# Patient Record
Sex: FEMALE | Employment: FULL TIME | URBAN - METROPOLITAN AREA
[De-identification: names, ages, dates, MRNs, and addresses within clinical notes are randomized per-mention and may not be internally consistent; named-entity substitution may affect disease eponyms.]

---

## 2022-06-20 ENCOUNTER — TELEPHONE (OUTPATIENT)
Dept: OBGYN CLINIC | Age: 27
End: 2022-06-20

## 2022-06-20 NOTE — TELEPHONE ENCOUNTER
Ob education/Intake (telephone Visit): IPV scheduled on 2022    Planned Pregnancy-      Father of [address]    Father of baby Ethnicity:     Patient Ethnicity:      Pt occupation:   [employer]    Blood Type:    Gr 1   Para 0     Section History/Vaginal Delivery History-  N/A    LMP-        Regular cycles    BCP at Conception:  No    Any medications/drugs/alcohol at conception/currently:    Sumatriptan - migraines  Spirolactone - hormonal acne  Vitamin B12  Vitamin C    Prenatal vitamins      Tobacco abuse:  Denies    Substance Abuse History- Denies    Depression/Anxiety History-Denies    Domestic Abuse History-Denies    Pets in home: 3 dogs. No cats    Last Pap:  2 yrs ago    Hx abnormal PAP: NO    Pt BMI:   21.1   Hgt 5'7\"  Wgt 135    Patient PMH:      Only surg- wisdom teeth    History of:     Any history of genetic or chromosomal aberrations, spina bifida or abdominal wall defects; omphalocele's or gastroschisis in herself the father to be or their respective families:  No    Hx Hypothyroidism: Denies    Hx preeclampsia or HTN: Denies    Hx PPD: N/A    Hx Diabetes: Denies    Hx GDM:  N/A          Hx STI: or ( hx HSV 1 or 2)  Denies    COVID Vaccine: Yes Pfizer         Flu Vaccine: No    High Risk Factor Screening for this Pregnancy:    · Age greater than 28 at delivery: No  Age 32    · History of  delivery: N/A    · History of diabetes (gestational or outside of pregnancy):   No    History of cHTN:  No    Physical activity-:  Runs 3x/wk,  Lifts some weights. Screening for pregestational diabetes:    ? BMI greater than 30: No   If Yes, need early glucola    ? BMI greater than 25 ( Americans greater than 23): No   If Yes, need 1 more risk factor. § First-degree relative with diabetes: No    § High-risk race of ethnicity (eg, [place] American, , , [address] [place] [address]):  No    § Previously given birth to an infant weighing [id number] (4000g) or more: N/A    § History of hypertension: Denies    § HDL < 35mg/dL and/or a triglyceride level greater than 250 mg/dL: No    § History of polycystic ovarian syndrome: No    § A1c greater than or equal to 5.7%: Unknown    Review :    Discussed seeing Dr. Мария zamora/boo @ [facility] , physicians in the on call group and call schedule and on call delivery, how to reach Ob/Gyn afterhours- Pt verb understanding

## 2022-06-23 ENCOUNTER — TELEPHONE (OUTPATIENT)
Dept: OBGYN CLINIC | Age: 27
End: 2022-06-23

## 2022-06-28 ENCOUNTER — OFFICE VISIT (OUTPATIENT)
Dept: PRIMARY CARE CLINIC | Age: 27
End: 2022-06-28
Payer: COMMERCIAL

## 2022-06-28 VITALS
SYSTOLIC BLOOD PRESSURE: 114 MMHG | HEIGHT: 68 IN | BODY MASS INDEX: 20.03 KG/M2 | WEIGHT: 132.2 LBS | OXYGEN SATURATION: 99 % | HEART RATE: 78 BPM | DIASTOLIC BLOOD PRESSURE: 68 MMHG

## 2022-06-28 DIAGNOSIS — Z00.00 HEALTH CARE MAINTENANCE: ICD-10-CM

## 2022-06-28 DIAGNOSIS — Z34.90 PREGNANCY, UNSPECIFIED GESTATIONAL AGE: Primary | ICD-10-CM

## 2022-06-28 PROBLEM — H52.209 ASTIGMATISM: Status: ACTIVE | Noted: 2017-10-04

## 2022-06-28 PROCEDURE — 99204 OFFICE O/P NEW MOD 45 MIN: CPT | Performed by: PHYSICIAN ASSISTANT

## 2022-06-28 RX ORDER — SUMATRIPTAN 100 MG/1
100 TABLET, FILM COATED ORAL
COMMUNITY

## 2022-06-28 RX ORDER — SPIRONOLACTONE 50 MG/1
50 TABLET, FILM COATED ORAL DAILY
COMMUNITY
End: 2022-06-28 | Stop reason: ALTCHOICE

## 2022-06-28 SDOH — ECONOMIC STABILITY: FOOD INSECURITY: WITHIN THE PAST 12 MONTHS, YOU WORRIED THAT YOUR FOOD WOULD RUN OUT BEFORE YOU GOT MONEY TO BUY MORE.: NEVER TRUE

## 2022-06-28 SDOH — ECONOMIC STABILITY: FOOD INSECURITY: WITHIN THE PAST 12 MONTHS, THE FOOD YOU BOUGHT JUST DIDN'T LAST AND YOU DIDN'T HAVE MONEY TO GET MORE.: NEVER TRUE

## 2022-06-28 ASSESSMENT — ENCOUNTER SYMPTOMS
DIARRHEA: 0
COUGH: 0
ABDOMINAL DISTENTION: 0
CHEST TIGHTNESS: 0
SORE THROAT: 0
SINUS PRESSURE: 0
SHORTNESS OF BREATH: 0
RHINORRHEA: 0
ABDOMINAL PAIN: 0
PHOTOPHOBIA: 0
VOMITING: 0
CONSTIPATION: 0
EYE DISCHARGE: 0

## 2022-06-28 ASSESSMENT — PATIENT HEALTH QUESTIONNAIRE - PHQ9
SUM OF ALL RESPONSES TO PHQ QUESTIONS 1-9: 0
SUM OF ALL RESPONSES TO PHQ9 QUESTIONS 1 & 2: 0
2. FEELING DOWN, DEPRESSED OR HOPELESS: 0
SUM OF ALL RESPONSES TO PHQ QUESTIONS 1-9: 0
1. LITTLE INTEREST OR PLEASURE IN DOING THINGS: 0

## 2022-06-28 ASSESSMENT — SOCIAL DETERMINANTS OF HEALTH (SDOH): HOW HARD IS IT FOR YOU TO PAY FOR THE VERY BASICS LIKE FOOD, HOUSING, MEDICAL CARE, AND HEATING?: NOT HARD AT ALL

## 2022-06-28 NOTE — PROGRESS NOTES
[facility] PRIMARY CARE  [address]  [facility] [address]  Dept: [phone]    Haydee Crenshaw is a 32 y.o. female New patient, who presents today for her medical conditions/complaints as noted below. Chief Complaint   Patient presents with    New Patient     Pt here today to get established. Pt did recently found out she was pregnant and has questions regarding her medications       HPI:     HPI: the patient is here for establishing care. The patient is recently pregnant and is going to [facility] for her pregnancy. That is July 5th. Will do U/S at that appointment. Taking prenatal vitamins. Last period was in April. Taking 500 mg of sumatriptan. Reviewed prior notes None  Reviewed previous Labs    No results found for: LDLCHOLESTEROL, LDLCALC    (goal LDL is <100)   No results found for: AST, ALT, BUN, CR, LABA1C, TSH  BP Readings from Last 3 Encounters:   06/28/22 114/68          (goal 120/80)    History reviewed. No pertinent past medical history. Past Surgical History:   Procedure Laterality Date    WISDOM TOOTH EXTRACTION         Family History   Problem Relation Age of Onset    Heart Disease Father     Heart Disease Paternal Grandmother     Heart Disease Paternal Grandfather        Social History     Tobacco Use    Smoking status: Never Smoker    Smokeless tobacco: Never Used   Substance Use Topics    Alcohol use: Not Currently     Comment: not currently due to pregnancy      Current Outpatient Medications   Medication Sig Dispense Refill    SUMAtriptan (IMITREX) 100 MG tablet Take 100 mg by mouth once as needed for Migraine       No current facility-administered medications for this visit.      Allergies   Allergen Reactions    Penicillins Rash     Other reaction(s): Rash-Severe  As a child  As a child         Health Maintenance   Topic Date Due    Varicella vaccine (1 of 2 - 2-dose childhood series) Never done   Leigh Bobo DTaP/Tdap/Td vaccine (6 - Tdap) 09/06/2006    Hepatitis C screen  Never done    Pap smear  03/16/2020    COVID-19 Vaccine (3 - Booster for Pfizer series) 07/08/2022    Flu vaccine (Season Ended) 09/01/2022    Depression Screen  06/28/2023    Hepatitis B vaccine  Completed    Hib vaccine  Completed    HPV vaccine  Completed    Meningococcal (ACWY) vaccine  Completed    HIV screen  Completed    Hepatitis A vaccine  Aged Out    Pneumococcal 0-64 years Vaccine  Aged Out       Subjective:      Review of Systems   Constitutional: Negative for chills, fever and unexpected weight change. HENT: Negative for congestion, hearing loss, rhinorrhea, sinus pressure and sore throat. Eyes: Negative for photophobia, discharge and visual disturbance. Respiratory: Negative for cough, chest tightness and shortness of breath. Cardiovascular: Negative for chest pain, palpitations and leg swelling. Gastrointestinal: Negative for abdominal distention, abdominal pain, constipation, diarrhea and vomiting. Endocrine: Negative for polydipsia and polyuria. Genitourinary: Negative for decreased urine volume, difficulty urinating, frequency and urgency. Musculoskeletal: Negative for arthralgias, gait problem and myalgias. Skin: Negative for rash. Allergic/Immunologic: Negative for food allergies. Neurological: Negative for dizziness, weakness, numbness and headaches. Hematological: Negative for adenopathy. Psychiatric/Behavioral: Negative for dysphoric mood and sleep disturbance. The patient is not nervous/anxious. Objective:     /68   Pulse 78   Ht 5' 8.31\" (1.735 m)   Wt 132 lb 3.2 oz (60 kg)   SpO2 99%   BMI 19.92 kg/m²   Physical Exam  Constitutional:       General: She is not in acute distress. Appearance: Normal appearance. She is not ill-appearing. HENT:      Head: Normocephalic and atraumatic.       Right Ear: External ear normal.      Left Ear: External ear normal.      Nose: Nose normal.      Mouth/Throat:      Mouth: Mucous membranes are moist.   Eyes:      Extraocular Movements: Extraocular movements intact. Conjunctiva/sclera: Conjunctivae normal.      Pupils: Pupils are equal, round, and reactive to light. Neck:      Vascular: No carotid bruit. Cardiovascular:      Rate and Rhythm: Normal rate and regular rhythm. Pulses: Normal pulses. Heart sounds: Normal heart sounds. Pulmonary:      Effort: Pulmonary effort is normal. No respiratory distress. Breath sounds: Normal breath sounds. Abdominal:      General: Bowel sounds are normal. There is no distension. Tenderness: There is no abdominal tenderness. Musculoskeletal:         General: Normal range of motion. Cervical back: Normal range of motion and neck supple. Lymphadenopathy:      Cervical: No cervical adenopathy. Skin:     General: Skin is warm and dry. Neurological:      General: No focal deficit present. Mental Status: She is alert and oriented to person, place, and time. Psychiatric:         Mood and Affect: Mood normal.         Behavior: Behavior normal.         Thought Content: Thought content normal.         Assessment and Plan:          1. Pregnancy, unspecified gestational age  -     hCG, Quantitative, Pregnancy; Future  2. Health care maintenance  -     CBC with Auto Differential; Future  -     Comprehensive Metabolic Panel; Future  -     Lipid, Fasting; Future  -     TSH With Reflex Ft4; Future     Patient educated she can take sumatriptan if absolutely necessary cut dose in half. Try hydration and rest first.  Patient educated to call her OB to make sure her appointment is set up. Monitor blood pressure at home. Continue with prenatal vitamins. Educated to tell family to get updated for Tdap. Patient given educational materials - see patient instructions. Discussed use, benefit, and side effects of prescribed medications. All patient questions answered. Pt voiced understanding. Reviewed health maintenance. Instructed to continue current medications, diet and exercise. Patient agreed with treatment plan. Follow up as directed.      Electronically signed by MATHIEU Frey on 6/28/2022 at 8:07 AM

## 2022-06-30 DIAGNOSIS — Z34.90 PREGNANCY, UNSPECIFIED GESTATIONAL AGE: ICD-10-CM

## 2022-06-30 DIAGNOSIS — Z00.00 HEALTH CARE MAINTENANCE: ICD-10-CM

## 2022-06-30 LAB
ABSOLUTE EOS #: 0.1 K/UL (ref 0–0.44)
ABSOLUTE IMMATURE GRANULOCYTE: 0.03 K/UL (ref 0–0.3)
ABSOLUTE LYMPH #: 1.57 K/UL (ref 1.1–3.7)
ABSOLUTE MONO #: 0.57 K/UL (ref 0.1–1.2)
ALBUMIN SERPL-MCNC: 4.3 G/DL (ref 3.5–5.2)
ALBUMIN/GLOBULIN RATIO: 1.7 (ref 1–2.5)
ALP BLD-CCNC: 48 U/L (ref 35–104)
ALT SERPL-CCNC: 18 U/L (ref 5–33)
ANION GAP SERPL CALCULATED.3IONS-SCNC: 10 MMOL/L (ref 9–17)
AST SERPL-CCNC: 19 U/L
BASOPHILS # BLD: 1 % (ref 0–2)
BASOPHILS ABSOLUTE: 0.04 K/UL (ref 0–0.2)
BILIRUB SERPL-MCNC: 0.67 MG/DL (ref 0.3–1.2)
BUN BLDV-MCNC: 7 MG/DL (ref 6–20)
CALCIUM SERPL-MCNC: 9.3 MG/DL (ref 8.6–10.4)
CHLORIDE BLD-SCNC: 105 MMOL/L (ref 98–107)
CHOLESTEROL, FASTING: 144 MG/DL
CHOLESTEROL/HDL RATIO: 2.5
CO2: 23 MMOL/L (ref 20–31)
CREAT SERPL-MCNC: 0.59 MG/DL (ref 0.5–0.9)
EOSINOPHILS RELATIVE PERCENT: 2 % (ref 1–4)
GFR AFRICAN AMERICAN: >60 ML/MIN
GFR NON-AFRICAN AMERICAN: >60 ML/MIN
GFR SERPL CREATININE-BSD FRML MDRD: NORMAL ML/MIN/{1.73_M2}
GLUCOSE BLD-MCNC: 78 MG/DL (ref 70–99)
HCG QUANTITATIVE: ABNORMAL MIU/ML
HCT VFR BLD CALC: 42.6 % (ref 36.3–47.1)
HDLC SERPL-MCNC: 58 MG/DL
HEMOGLOBIN: 14.3 G/DL (ref 11.9–15.1)
IMMATURE GRANULOCYTES: 0 %
LDL CHOLESTEROL: 78 MG/DL (ref 0–130)
LYMPHOCYTES # BLD: 23 % (ref 24–43)
MCH RBC QN AUTO: 30.6 PG (ref 25.2–33.5)
MCHC RBC AUTO-ENTMCNC: 33.6 G/DL (ref 28.4–34.8)
MCV RBC AUTO: 91.2 FL (ref 82.6–102.9)
MONOCYTES # BLD: 8 % (ref 3–12)
NRBC AUTOMATED: 0 PER 100 WBC
PDW BLD-RTO: 12.2 % (ref 11.8–14.4)
PLATELET # BLD: 390 K/UL (ref 138–453)
PMV BLD AUTO: 9.7 FL (ref 8.1–13.5)
POTASSIUM SERPL-SCNC: 4.4 MMOL/L (ref 3.7–5.3)
RBC # BLD: 4.67 M/UL (ref 3.95–5.11)
SEG NEUTROPHILS: 66 % (ref 36–65)
SEGMENTED NEUTROPHILS ABSOLUTE COUNT: 4.58 K/UL (ref 1.5–8.1)
SODIUM BLD-SCNC: 138 MMOL/L (ref 135–144)
TOTAL PROTEIN: 6.9 G/DL (ref 6.4–8.3)
TRIGLYCERIDE, FASTING: 40 MG/DL
TSH SERPL DL<=0.05 MIU/L-ACNC: 0.94 UIU/ML (ref 0.3–5)
WBC # BLD: 6.9 K/UL (ref 3.5–11.3)

## 2022-07-05 ENCOUNTER — INITIAL PRENATAL (OUTPATIENT)
Dept: OBGYN CLINIC | Age: 27
End: 2022-07-05

## 2022-07-05 ENCOUNTER — HOSPITAL ENCOUNTER (OUTPATIENT)
Age: 27
Setting detail: SPECIMEN
Discharge: HOME | End: 2022-07-05

## 2022-07-05 VITALS — SYSTOLIC BLOOD PRESSURE: 110 MMHG | BODY MASS INDEX: 20.49 KG/M2 | WEIGHT: 136 LBS | DIASTOLIC BLOOD PRESSURE: 64 MMHG

## 2022-07-05 DIAGNOSIS — Z34.90 NORMAL INTRAUTERINE PREGNANCY, ANTEPARTUM: ICD-10-CM

## 2022-07-05 DIAGNOSIS — N89.8 VAGINAL DISCHARGE: ICD-10-CM

## 2022-07-05 DIAGNOSIS — Z3A.09 9 WEEKS GESTATION OF PREGNANCY: ICD-10-CM

## 2022-07-05 DIAGNOSIS — Z34.90 NORMAL PREGNANCY, ANTEPARTUM: ICD-10-CM

## 2022-07-05 DIAGNOSIS — Z34.90 NORMAL INTRAUTERINE PREGNANCY, ANTEPARTUM: Primary | ICD-10-CM

## 2022-07-05 DIAGNOSIS — Z86.69 HISTORY OF MIGRAINE HEADACHES: ICD-10-CM

## 2022-07-05 LAB
AMPHETAMINE SCREEN URINE: NEGATIVE
BARBITURATE SCREEN URINE: NEGATIVE
BENZODIAZEPINE SCREEN, URINE: NEGATIVE
CANDIDA SPECIES, DNA PROBE: POSITIVE
CANNABINOID SCREEN URINE: NEGATIVE
COCAINE METABOLITE, URINE: NEGATIVE
GARDNERELLA VAGINALIS, DNA PROBE: POSITIVE
METHADONE SCREEN, URINE: NEGATIVE
OPIATES, URINE: NEGATIVE
OXYCODONE SCREEN URINE: NEGATIVE
PHENCYCLIDINE, URINE: NEGATIVE
SOURCE: ABNORMAL
TEST INFORMATION: NORMAL
TRICHOMONAS VAGINALIS DNA: NEGATIVE

## 2022-07-05 PROCEDURE — 0502F SUBSEQUENT PRENATAL CARE: CPT | Performed by: ADVANCED PRACTICE MIDWIFE

## 2022-07-05 ASSESSMENT — PATIENT HEALTH QUESTIONNAIRE - PHQ9
SUM OF ALL RESPONSES TO PHQ QUESTIONS 1-9: 0
2. FEELING DOWN, DEPRESSED OR HOPELESS: 0
SUM OF ALL RESPONSES TO PHQ QUESTIONS 1-9: 0
1. LITTLE INTEREST OR PLEASURE IN DOING THINGS: 0
SUM OF ALL RESPONSES TO PHQ9 QUESTIONS 1 & 2: 0
SUM OF ALL RESPONSES TO PHQ QUESTIONS 1-9: 0
SUM OF ALL RESPONSES TO PHQ QUESTIONS 1-9: 0

## 2022-07-05 NOTE — PROGRESS NOTES
Risk Factor Screening for this Pregnancy:   Age greater than 28 at delivery: No   History of  delivery: n/a   History of diabetes (gestational or outside of pregnancy): No, On medications: NA   Screening for pregestational diabetes:   o BMI greater than 30: No. If Yes, need early glucola   History of Hypertension: No, On medications: NA   History of bleeding disorders: No   History of migraine headaches: Yes  o How often: once every 2 weeks  o Symptoms: nausea/loss of appetite, tunnel vision, light sensitivity   o Medications/intervention history: sumatriptan    See [facility] Navigator for further genetic and risk screening. Objective:  /64   Wt 136 lb (61.7 kg)   LMP 2022   BMI 20.49 kg/m²   Body mass index is 20.49 kg/m². Physical Exam  Vitals reviewed. Constitutional:       General: She is not in acute distress. Appearance: She is well-developed. She is not diaphoretic. Neck:      Thyroid: No thyromegaly or thyroid tenderness. Cardiovascular:      Rate and Rhythm: Normal rate and regular rhythm. Heart sounds: Normal heart sounds. Pulmonary:      Effort: Pulmonary effort is normal. No respiratory distress. Breath sounds: Normal breath sounds. Abdominal:      General: There is no distension. Palpations: Abdomen is soft. Tenderness: There is no abdominal tenderness. Genitourinary:     Labia:         Right: No rash, tenderness or lesion. Left: No rash, tenderness or lesion. Cervix: Friability (pap obtained with some difficulty) present. No cervical motion tenderness. Musculoskeletal:         General: Normal range of motion. Cervical back: Normal range of motion. Skin:     General: Skin is warm and dry. Neurological:      Mental Status: She is alert and oriented to person, place, and time. Mental status is at baseline. Psychiatric:         Behavior: Behavior normal.         Thought Content:  Thought content normal. Judgment: Judgment normal.         Chaperone for Intimate Exam   Chaperone was offered as part of the rooming process. Patient declined and agrees to continue with exam without a chaperone.  Chaperone: n/a    Mother's Ethnicity:   Father's Ethnicity:       Assessment/Plan:  Pregnancy at Unknown    Role of ultrasound in pregnancy discussed; fetal survey: declines   She was counseled on office policies. She was educated about the anticipated course of prenatal care.  Warning signs were reviewed.  The patient was counseled on drug screening, HIV, Cystic Fibrosis, SMA and Sickle Cell disease, as appropriate.   o She verbally consented to HIV testing and drug screening. o She requested CF/SMA/Fragile X testing.  She was counseled on Toxoplasmosis/Listeriosis (cats/raw meat).  She denies tobacco use. The patient was counseled on the risks of tobacco abuse, both maternal and fetal. She was instructed to stop smoking if currently using tobacco. Morbidity, mortality, and cessation programs were reviewed. The risks include but are not limited to increased risks of  labor,  delivery, premature rupture of membranes, intrauterine growth restriction, intrauterine fetal demise and abruptio placenta. Secondary smoke risks were also reviewed. Increases in cancer, respiratory problems, and sudden infant death syndrome were reviewed as well.  The patient was informed of a 2-4% risk of congenital anomalies in the general population. She was questioned in detail regarding any genetic misnomer history, chromosomal abnormalities, or learning disabilities in herself, the father of the baby or their families. She denies any history as stated above.  Discussed in detail referral for genetic screening through [facility]. She verbalizes understanding and would like to proceed with: 1st trimester screen   Single Marker MSAFP testing was not ordered with attention to timing.  She was informed that karyotyping is the only way to evaluate the fetus for genetic problems and genetic lethal anomalies. Amniocentesis was discussed: Not indicated.  Route of delivery and counseling on vaginal, operative vaginal, and  sections were discussed. Further counseling will be handled by the provider at subsequent visits.  Encouraged well-balanced diet, plenty of rest when needed, prenatal vitamins daily and walking for exercise. Discussed self-help for nausea, avoiding OTC medications until consulting provider or pharmacist, other than Tylenol PRN, minimal caffeine (1-2 cups daily) and avoiding alcohol. Discussed exercise safety in pregnancy. 1. Normal intrauterine pregnancy, antepartum  - Prenatal Profile I; Future  - HIV Screen; Future  - Urine Drug Screen; Future  - C.trachomatis N.gonorrhoeae DNA  - Varicella Zoster Antibody, IgG; Future  - Hepatitis C Antibody; Future  - C.trachomatis N.gonorrhoeae DNA, Thin Prep; Future  - Culture, Urine; Future  - Prenatal type and screen; Future  - Ambulatory referral to Maternal Fetal  - PAP Smear; Future    2. 9 weeks gestation of pregnancy  - Prenatal Profile I; Future  - HIV Screen; Future  - Urine Drug Screen; Future  - C.trachomatis N.gonorrhoeae DNA  - Varicella Zoster Antibody, IgG; Future  - Hepatitis C Antibody; Future  - C.trachomatis N.gonorrhoeae DNA, Thin Prep; Future  - Culture, Urine; Future  - Prenatal type and screen; Future  - Ambulatory referral to Maternal Fetal  - PAP Smear; Future    3. Vaginal discharge  - Vaginitis DNA Probe; Future      Upon completion of the visit all questions were answered. ROS was done and is negative except as documented in HPI. History was reviewed as documented on Epic Navigator.     The patient, Gail Curran, was seen with a total time spent of 25 minutes for the visit on this date of service by the [facility]  The time component involved both face-to-face (counseling and education) and non face-to-face time (care coordination), spent in determining the total time component. Return in about 4 weeks (around 8/2/2022) for Return OB.     Electronically Signed by: Osei Lane

## 2022-07-06 ENCOUNTER — TELEPHONE (OUTPATIENT)
Dept: OBGYN CLINIC | Age: 27
End: 2022-07-06

## 2022-07-06 DIAGNOSIS — N76.0 BV (BACTERIAL VAGINOSIS): Primary | ICD-10-CM

## 2022-07-06 DIAGNOSIS — B96.89 BV (BACTERIAL VAGINOSIS): Primary | ICD-10-CM

## 2022-07-06 DIAGNOSIS — B37.9 YEAST INFECTION: ICD-10-CM

## 2022-07-06 LAB
CULTURE: NORMAL
SPECIMEN DESCRIPTION: NORMAL

## 2022-07-06 RX ORDER — METRONIDAZOLE 500 MG/1
500 TABLET ORAL 2 TIMES DAILY
Qty: 14 TABLET | Refills: 0 | Status: SHIPPED | OUTPATIENT
Start: 2022-07-06 | End: 2022-07-13

## 2022-07-07 LAB
CHLAMYDIA BY THIN PREP: NEGATIVE
N. GONORRHOEAE DNA, THIN PREP: NEGATIVE
SPECIMEN DESCRIPTION: NORMAL

## 2022-07-08 DIAGNOSIS — Z3A.09 9 WEEKS GESTATION OF PREGNANCY: ICD-10-CM

## 2022-07-08 DIAGNOSIS — Z34.90 NORMAL INTRAUTERINE PREGNANCY, ANTEPARTUM: ICD-10-CM

## 2022-07-08 LAB
ABO/RH: NORMAL
ANTIBODY SCREEN: NEGATIVE
HEPATITIS C ANTIBODY: NONREACTIVE

## 2022-07-09 LAB
ABSOLUTE EOS #: 0.1 K/UL (ref 0–0.44)
ABSOLUTE IMMATURE GRANULOCYTE: 0.03 K/UL (ref 0–0.3)
ABSOLUTE LYMPH #: 1.1 K/UL (ref 1.1–3.7)
ABSOLUTE MONO #: 0.61 K/UL (ref 0.1–1.2)
BASOPHILS # BLD: 1 % (ref 0–2)
BASOPHILS ABSOLUTE: 0.05 K/UL (ref 0–0.2)
EOSINOPHILS RELATIVE PERCENT: 1 % (ref 1–4)
HCT VFR BLD CALC: 41.9 % (ref 36.3–47.1)
HEMOGLOBIN: 13.8 G/DL (ref 11.9–15.1)
HEPATITIS B SURFACE ANTIGEN: NONREACTIVE
HIV AG/AB: NONREACTIVE
IMMATURE GRANULOCYTES: 0 %
LYMPHOCYTES # BLD: 12 % (ref 24–43)
MCH RBC QN AUTO: 30.3 PG (ref 25.2–33.5)
MCHC RBC AUTO-ENTMCNC: 32.9 G/DL (ref 28.4–34.8)
MCV RBC AUTO: 92.1 FL (ref 82.6–102.9)
MONOCYTES # BLD: 7 % (ref 3–12)
NRBC AUTOMATED: 0 PER 100 WBC
PDW BLD-RTO: 12.3 % (ref 11.8–14.4)
PLATELET # BLD: 338 K/UL (ref 138–453)
PMV BLD AUTO: 10.3 FL (ref 8.1–13.5)
RBC # BLD: 4.55 M/UL (ref 3.95–5.11)
RUBV IGG SER QL: 148.8 IU/ML
SEG NEUTROPHILS: 79 % (ref 36–65)
SEGMENTED NEUTROPHILS ABSOLUTE COUNT: 7.13 K/UL (ref 1.5–8.1)
T. PALLIDUM, IGG: NONREACTIVE
WBC # BLD: 9 K/UL (ref 3.5–11.3)

## 2022-07-11 LAB
CYTOLOGY REPORT: NORMAL
VZV IGG SER QL IA: 2.71

## 2022-07-26 ENCOUNTER — ROUTINE PRENATAL (OUTPATIENT)
Dept: PERINATAL CARE | Age: 27
End: 2022-07-26
Payer: COMMERCIAL

## 2022-07-26 ENCOUNTER — HOSPITAL ENCOUNTER (OUTPATIENT)
Age: 27
Discharge: HOME | End: 2022-07-26
Payer: COMMERCIAL

## 2022-07-26 VITALS
HEART RATE: 96 BPM | TEMPERATURE: 99 F | SYSTOLIC BLOOD PRESSURE: 110 MMHG | BODY MASS INDEX: 20.16 KG/M2 | RESPIRATION RATE: 16 BRPM | HEIGHT: 68 IN | DIASTOLIC BLOOD PRESSURE: 70 MMHG | WEIGHT: 133 LBS

## 2022-07-26 DIAGNOSIS — O36.80X0 ENCOUNTER TO DETERMINE FETAL VIABILITY OF PREGNANCY, SINGLE OR UNSPECIFIED FETUS: ICD-10-CM

## 2022-07-26 DIAGNOSIS — Z3A.11 11 WEEKS GESTATION OF PREGNANCY: ICD-10-CM

## 2022-07-26 DIAGNOSIS — Z36.9 FIRST TRIMESTER SCREENING: Primary | ICD-10-CM

## 2022-07-26 LAB
CRL: NORMAL
SAC DIAMETER: NORMAL

## 2022-07-26 PROCEDURE — 99999 PR OFFICE/OUTPT VISIT,PROCEDURE ONLY: CPT | Performed by: OBSTETRICS & GYNECOLOGY

## 2022-07-26 PROCEDURE — 76801 OB US < 14 WKS SINGLE FETUS: CPT | Performed by: OBSTETRICS & GYNECOLOGY

## 2022-07-26 PROCEDURE — 36415 COLL VENOUS BLD VENIPUNCTURE: CPT

## 2022-07-26 PROCEDURE — 81508 FTL CGEN ABNOR TWO PROTEINS: CPT

## 2022-07-26 PROCEDURE — 76813 OB US NUCHAL MEAS 1 GEST: CPT | Performed by: OBSTETRICS & GYNECOLOGY

## 2022-07-26 NOTE — PROGRESS NOTES
Obstetric/Gynecology Maternal Fetal Medicine Resident Note    Patient has opted for maternal prenatal genetic carrier screening and first trimester screening.     Yissel Quintana MD  OBGYN Resident, PGY2  [facility]  7/26/2022, 8:43 AM

## 2022-07-26 NOTE — PROGRESS NOTES
Patient has opted for the Five Gene Carrier Screen (with the [place] test from [address] [place] [address]) today.

## 2022-07-27 LAB
SEND OUT REPORT: NORMAL
TEST NAME: NORMAL

## 2022-07-30 NOTE — PROGRESS NOTES
SUBJECTIVE:    Puneet Gould is here for her return OB visit. admits to concerns today. She denies  feeling fetal movement. She denies vaginal bleeding. She denies vaginal discharge. She denies leaking of fluid. She denies uterine cramping. She denies  nausea and/or vomiting. OBJECTIVE:  Blood pressure 110/62, weight 134 lb 4 oz (60.9 kg), last menstrual period 04/30/2022. Total weight gain: -1 lbs      Puneet Gould accepted the COVID vaccine as appropriate    ASSESSMENT/PLAN:  IUP @ 122+6 weeks  S =D    Normal Pregnancy  Due date is based on LMP and confirmed with 8w6d early dating ultrasound  Patient's prenatal labs are completed. Patient's blood type A positive and rhogam is not indicated in pregnancy. Early glucola indicated: no  Patient accepted genetic screening. First trimester screen is completed and still processed. Anatomy scan scheduled 9/20/22. Glucola between 24-28 weeks. Reviewed with patient  Total weight gain in pregnancy reviewed: Yes    2. 12 weeks gestation of pregnancy  - Reviewed warning signs      She was counseled regarding all of the above:    Return in about 4 weeks (around 8/30/2022) for Return OB.     The patient, [facility],  was seen with a total time spent of 25 minutes for the visit on this date of service by the [facility]  On this date August 2, 2022,  I have spent greater than 50% of this visit:  [x] reviewing previous notes  [x] reviewing test results  [x] pre-charting  Discussed with patient:  [x] Importance of compliance with treatment plan  [x] Counseling  [] Coordinating care  [x] Documenting     Electronically Signed By: Osei Osuna

## 2022-08-02 ENCOUNTER — ROUTINE PRENATAL (OUTPATIENT)
Dept: OBGYN CLINIC | Age: 27
End: 2022-08-02

## 2022-08-02 VITALS
WEIGHT: 134.25 LBS | BODY MASS INDEX: 20.23 KG/M2 | DIASTOLIC BLOOD PRESSURE: 62 MMHG | SYSTOLIC BLOOD PRESSURE: 110 MMHG

## 2022-08-02 DIAGNOSIS — Z3A.12 12 WEEKS GESTATION OF PREGNANCY: ICD-10-CM

## 2022-08-02 DIAGNOSIS — Z34.90 NORMAL PREGNANCY, ANTEPARTUM: Primary | ICD-10-CM

## 2022-08-02 PROCEDURE — 0502F SUBSEQUENT PRENATAL CARE: CPT | Performed by: ADVANCED PRACTICE MIDWIFE

## 2022-08-23 LAB
AFP INTERPRETATION: NORMAL
CRL: 54 MM
CROWN RUMP LENGTH TWIN B: NORMAL MM
CROWN RUMP LENGTH: 54
DATE OF BIRTH: NORMAL
DONOR EGG?: NORMAL
GESTATIONAL AGE: NORMAL
HISTORY OF ANEUPLOIDY?: NO
IN VITRO FERTILIZATION: NO
MATERNAL AGE AT EDD: 27.4 YR
MATERNAL SCREEN, EER: NORMAL
MATERNAL WEIGHT: 133
MOM FOR NT, TWIN B: NORMAL
MOM FOR NT: 0.88
MOM FOR PAPP-A: 0.67
MONOCHORIONIC TWINS: NO
MSHCG-MOM: 1.82
MSHCG: NORMAL IU/L
NUCHAL TRANSLUC (NT): 1.1
NUCHAL TRANSLUC (NT): 1.1 MM
NUCHAL TRANSLUCENCY TWIN B: NORMAL MM
NUMBER OF FETUSES: 1
NUMBER OF FETUSES: NORMAL
PAPP-A MOM: 555.9 NG/ML
PATIENT WEIGHT UNITS: NORMAL
PATIENT WEIGHT: NORMAL
PREV TRISOMY PREG: NO
RACE (MATERNAL): NORMAL
RACE: NORMAL
READING MD CERT NUM: NORMAL
READING MD NAME: NORMAL
REPEAT SPECIMEN?: NO
SMOKING: NO
SMOKING: NO
SONOGRAPHER CERT NO: NORMAL
SONOGRAPHER CERT NO: NORMAL
SONOGRAPHER NAME: NORMAL
SONOGRAPHER NAME: NORMAL
SPECIMEN: NORMAL
ULTRASOUND DATE: NORMAL
ULTRASOUND DATE: NORMAL

## 2022-08-30 ENCOUNTER — ROUTINE PRENATAL (OUTPATIENT)
Dept: OBGYN CLINIC | Age: 27
End: 2022-08-30

## 2022-08-30 VITALS
DIASTOLIC BLOOD PRESSURE: 64 MMHG | SYSTOLIC BLOOD PRESSURE: 112 MMHG | BODY MASS INDEX: 20.81 KG/M2 | WEIGHT: 138.06 LBS

## 2022-08-30 DIAGNOSIS — Z34.90 NORMAL PREGNANCY, ANTEPARTUM: Primary | ICD-10-CM

## 2022-08-30 DIAGNOSIS — Z3A.16 16 WEEKS GESTATION OF PREGNANCY: ICD-10-CM

## 2022-08-30 PROCEDURE — 0502F SUBSEQUENT PRENATAL CARE: CPT | Performed by: ADVANCED PRACTICE MIDWIFE

## 2022-08-30 NOTE — PROGRESS NOTES
SUBJECTIVE:    Jeffery Dash is here for her return OB visit. denies concerns today. She denies  feeling fetal movement. She denies vaginal bleeding. She denies vaginal discharge. She denies leaking of fluid. She denies uterine cramping. She denies  nausea and/or vomiting. OBJECTIVE:  Blood pressure 112/64, weight 138 lb 1 oz (62.6 kg), last menstrual period 04/30/2022. Total weight gain: 2 lb 1 oz (0.936 kg)    Jeffery Dash accepted the COVID vaccine as appropriate    ASSESSMENT/PLAN:  IUP @ 16+6 weeks  S =D  Normal Pregnancy  Due date is based on LMP and confirmed with 8w6d early dating ultrasound  Patient's prenatal labs are completed. Patient's blood type A positive and rhogam is not indicated in pregnancy. Early glucola indicated: no  Patient accepted genetic screening. First trimester screen is completed ultrasound without any abnormal findings, risk assessment not available appears it was not drawn. Negative carrier screening. NIPT ordered 8/30/2022   Anatomy scan scheduled 9/20/22. Glucola between 24-28 weeks. Reviewed with patient  Total weight gain in pregnancy reviewed: Yes  Fetal movement reviewed      2. 16 weeks gestation of pregnancy  - Reviewed warning signs. She was counseled regarding all of the above:    Return in about 4 weeks (around 9/27/2022) for Return OB, with Heather CARRERO.     The patient, [facility],  was seen with a total time spent of 25 minutes for the visit on this date of service by the [facility]  On this date August 30, 2022,  I have spent greater than 50% of this visit:  [x] reviewing previous notes  [x] reviewing test results  [x] pre-charting  Discussed with patient:  [x] Importance of compliance with treatment plan  [x] Counseling  [] Coordinating care  [x] Documenting     Electronically Signed By: Osei Anderson

## 2022-09-20 ENCOUNTER — ROUTINE PRENATAL (OUTPATIENT)
Dept: PERINATAL CARE | Age: 27
End: 2022-09-20
Payer: COMMERCIAL

## 2022-09-20 VITALS
HEIGHT: 68 IN | WEIGHT: 145 LBS | DIASTOLIC BLOOD PRESSURE: 68 MMHG | RESPIRATION RATE: 16 BRPM | SYSTOLIC BLOOD PRESSURE: 110 MMHG | HEART RATE: 92 BPM | BODY MASS INDEX: 21.98 KG/M2 | TEMPERATURE: 98.1 F

## 2022-09-20 DIAGNOSIS — Z3A.19 19 WEEKS GESTATION OF PREGNANCY: ICD-10-CM

## 2022-09-20 DIAGNOSIS — Z13.89 ENCOUNTER FOR ROUTINE SCREENING FOR MALFORMATION USING ULTRASONICS: Primary | ICD-10-CM

## 2022-09-20 DIAGNOSIS — Z36.86 ENCOUNTER FOR SCREENING FOR RISK OF PRE-TERM LABOR: ICD-10-CM

## 2022-09-20 LAB
ABDOMINAL CIRCUMFERENCE: NORMAL
ABDOMINAL CIRCUMFERENCE: NORMAL
BIPARIETAL DIAMETER: NORMAL
BIPARIETAL DIAMETER: NORMAL
ESTIMATED FETAL WEIGHT: NORMAL
ESTIMATED FETAL WEIGHT: NORMAL
FEMORAL DIAMETER: NORMAL
FEMORAL DIAMETER: NORMAL
HC/AC: NORMAL
HC/AC: NORMAL
HEAD CIRCUMFERENCE: NORMAL
HEAD CIRCUMFERENCE: NORMAL

## 2022-09-20 PROCEDURE — 76817 TRANSVAGINAL US OBSTETRIC: CPT | Performed by: OBSTETRICS & GYNECOLOGY

## 2022-09-20 PROCEDURE — 99999 PR OFFICE/OUTPT VISIT,PROCEDURE ONLY: CPT | Performed by: OBSTETRICS & GYNECOLOGY

## 2022-09-20 PROCEDURE — 76805 OB US >/= 14 WKS SNGL FETUS: CPT | Performed by: OBSTETRICS & GYNECOLOGY

## 2022-09-27 ENCOUNTER — ROUTINE PRENATAL (OUTPATIENT)
Dept: OBGYN CLINIC | Age: 27
End: 2022-09-27

## 2022-09-27 VITALS
SYSTOLIC BLOOD PRESSURE: 110 MMHG | HEART RATE: 88 BPM | WEIGHT: 152 LBS | BODY MASS INDEX: 22.91 KG/M2 | DIASTOLIC BLOOD PRESSURE: 62 MMHG

## 2022-09-27 DIAGNOSIS — Z34.90 NORMAL PREGNANCY, ANTEPARTUM: ICD-10-CM

## 2022-09-27 DIAGNOSIS — Z3A.20 20 WEEKS GESTATION OF PREGNANCY: Primary | ICD-10-CM

## 2022-09-27 PROCEDURE — 0502F SUBSEQUENT PRENATAL CARE: CPT | Performed by: NURSE PRACTITIONER

## 2022-09-27 NOTE — PROGRESS NOTES
Presents for OB visit  Gestation 20w6d  Estimated Date of Delivery: 23        Insurance - [facility]    Plans to deliver:    FOB- Justin    1st trimester screen/NIPs: NEG cfDNA/NIPS    AFP    Fetal Echo    High Risk:    Early 1 hr GTT:    Covid Vaccine:    Flu Vaccine:    Tdap:    Rhogam:    1hr GTT:    3hr GTT:    GBS:    2nd trimester appointment with Dr. Colon Monday :     3rd trimester appointment with Dr. Colon Monday :       The patient was seen and evaluated. There was Positive fetal movements yes. No contractions or leakage of fluid. Signs and symptoms of  labor as well as labor were reviewed. The Nuchal Translucency testing was reviewed and found to be completed and  completed NIPs. A single marker MSAFP was declined for a 15-20 week gestational age window. TOP ST OH Reviewed. Dates were reviewed with the patient. An 18-22 week anatomy ultrasound has been ordered and completed  The patient will return to the office for her next visit in 4 weeks. See antepartum flow sheet. OB History    Para Term  AB Living   1             SAB IAB Ectopic Molar Multiple Live Births                    # Outcome Date GA Lbr Alejandro/2nd Weight Sex Delivery Anes PTL Lv   1 Current                     Allergies   Allergen Reactions    Penicillins     Penicillins Rash     Other reaction(s): Rash-Severe  As a child  As a child       Current Outpatient Medications   Medication Sig Dispense Refill    Prenat w/o S-AP-[id number]-FA-DHA (PNV-DHA PO) Take by mouth      SUMAtriptan (IMITREX) 100 MG tablet Take 100 mg by mouth once as needed for Migraine (Patient not taking: No sig reported)       No current facility-administered medications for this visit. History reviewed. No pertinent past medical history.     Past Surgical History:   Procedure Laterality Date    WISDOM TOOTH EXTRACTION       Headaches: no  Swelling: no  Bleeding: no  Discharge: no   Dysuria: no  Nausea: no  Heartburn: no  Epigastric pain: no  Contractions: no  Leakage of fluid: no  + fetal movement       Return 4 WEEKS    Labs as indicated CBC,1 hr GTT, UA between 24-28 weeks  Antibody screen: n/a     PTL signs reviewed, if indicated  Kick Count Instructions given if indicated: The patient was instructed on fetal kick counts and was given a kick sheet to complete every 8 hours. This is to begin at 28 weeks gestation. She was instructed that the baby should move at a minimum of ten times within one hour after a meal. The patient was instructed to lay down on her left side twenty minutes after eating and count movements for up to one hour with a target value of ten movements. She was instructed to notify the office if she did not make that target after two attempts or if after any attempt there was less than four movements. After hour numbers reviewed , along with labor signs if indicated. Contractions/cramping between 5-7 minutes and persisting even with attempts of increased water and laying on side. Fluid leakage, bleeding  NST information given no  The patient was counseled on Labor & Delivery. Route of delivery and counseling on vaginal, operative vaginal, and  sections were completed with the risks of each to both the patient as well as her baby. The possibility of a blood transfusion was discussed as well. The patient was not opposed to receiving a transfusion if needed. The patient was counseled on types of analgesia during labor and is considering either Regional or IV medication the risks, benefits and alternatives were discussed. The patient was counseled on the mandatory call ahead policy. She has been instructed to call the office at anytime prior to going into the hospital so the on-call physician may direct her to the appropriate facility for care.  Exceptions were reviewed including but not limited to: Decreased fetal movement, vaginal Bleeding or hemorrhage, trauma, readily expectant delivery, or

## 2022-09-30 DIAGNOSIS — N76.0 BV (BACTERIAL VAGINOSIS): ICD-10-CM

## 2022-09-30 DIAGNOSIS — B96.89 BV (BACTERIAL VAGINOSIS): ICD-10-CM

## 2022-10-04 RX ORDER — METRONIDAZOLE 500 MG/1
TABLET ORAL
Qty: 14 TABLET | Refills: 0 | OUTPATIENT
Start: 2022-10-04

## 2022-10-05 ENCOUNTER — HOSPITAL ENCOUNTER (OUTPATIENT)
Age: 27
Setting detail: SPECIMEN
Discharge: HOME | End: 2022-10-05

## 2022-10-05 ENCOUNTER — OFFICE VISIT (OUTPATIENT)
Dept: OBGYN CLINIC | Age: 27
End: 2022-10-05
Payer: COMMERCIAL

## 2022-10-05 VITALS
WEIGHT: 153 LBS | DIASTOLIC BLOOD PRESSURE: 64 MMHG | BODY MASS INDEX: 23.19 KG/M2 | HEIGHT: 68 IN | SYSTOLIC BLOOD PRESSURE: 110 MMHG

## 2022-10-05 DIAGNOSIS — N89.8 VAGINAL DISCHARGE: ICD-10-CM

## 2022-10-05 DIAGNOSIS — Z23 NEEDS FLU SHOT: ICD-10-CM

## 2022-10-05 DIAGNOSIS — Z34.90 NORMAL PREGNANCY, ANTEPARTUM: Primary | ICD-10-CM

## 2022-10-05 DIAGNOSIS — Z3A.22 22 WEEKS GESTATION OF PREGNANCY: ICD-10-CM

## 2022-10-05 PROCEDURE — 90674 CCIIV4 VAC NO PRSV 0.5 ML IM: CPT | Performed by: ADVANCED PRACTICE MIDWIFE

## 2022-10-05 PROCEDURE — 0502F SUBSEQUENT PRENATAL CARE: CPT | Performed by: ADVANCED PRACTICE MIDWIFE

## 2022-10-05 PROCEDURE — 90471 IMMUNIZATION ADMIN: CPT | Performed by: ADVANCED PRACTICE MIDWIFE

## 2022-10-05 NOTE — PROGRESS NOTES
SUBJECTIVE:    Samara Nunez is here for her EXTRA OB visit. admits to concerns today. Concerns include thinking she may have bv and or another yeast infection. She reports  feeling fetal movement. She denies vaginal bleeding. She denies vaginal discharge. She denies leaking of fluid. She denies uterine cramping. She denies  nausea and/or vomiting. OBJECTIVE:  Blood pressure 110/64, height 5' 8.3\" (1.735 m), weight 153 lb (69.4 kg), last menstrual period 04/30/2022. Total weight gain: 16 lb (7.258 kg)    Samara Nunez accepted the flu vaccine as appropriate. ASSESSMENT/PLAN:  IUP @ 22+0 weeks  S =D    Normal Pregnancy  Due date is based on LMP and confirmed with 8w6d early dating ultrasound  Patient's prenatal labs are completed. Patient's blood type A positive and rhogam is not indicated in pregnancy. Early glucola indicated: no  Patient accepted genetic screening. First trimester screen is completed ultrasound without any abnormal findings, risk assessment not available appears it was not drawn. Negative carrier screening. NIPT ordered 8/30/2022 - negative  Anatomy scan completed. Placenta anterior,normal cord insertion: Yes, cervical length 4.74 cm, no low lying, no previa noted. Follow up as clinically indicated. Glucola between 24-28 weeks. Reviewed   Total weight gain in pregnancy reviewed: No  Fetal movement reviewed      2. 22 weeks gestation of pregnancy  - Reviewed warning signs    3. Vaginal discharge  - Will follow up with results  - Vaginitis DNA Probe; Future  - C.trachomatis N.gonorrhoeae DNA; Future  - Culture, Urine; Future    4. Needs flu shot  - Vaccine information statement influenza (inactivated) edition 8/6/21given to patient on 10/5/2022  - Influenza, FLUCELVAX, (age 10 mo+), IM, Preservative Free, 0.5 mL        She was counseled regarding all of the above:    Return in about 4 weeks (around 11/2/2022) for Return OB.     The patient, Grayson Sandra,  was seen with a total time spent of 25 minutes for the visit on this date of service by the [facility]  On this date October 5, 2022,  I have spent greater than 50% of this visit:  [x] reviewing previous notes  [x] reviewing test results  [x] pre-charting  Discussed with patient:  [x] Importance of compliance with treatment plan  [x] Counseling  [] Coordinating care  [x] Documenting     Electronically Signed By: Osei Ross

## 2022-10-06 DIAGNOSIS — N76.0 BV (BACTERIAL VAGINOSIS): ICD-10-CM

## 2022-10-06 DIAGNOSIS — B96.89 BV (BACTERIAL VAGINOSIS): ICD-10-CM

## 2022-10-06 DIAGNOSIS — B37.31 VAGINAL YEAST INFECTION: Primary | ICD-10-CM

## 2022-10-06 LAB
C TRACH DNA GENITAL QL NAA+PROBE: NEGATIVE
CANDIDA SPECIES, DNA PROBE: POSITIVE
CULTURE: NORMAL
CULTURE: NORMAL
GARDNERELLA VAGINALIS, DNA PROBE: POSITIVE
N. GONORRHOEAE DNA: NEGATIVE
SOURCE: ABNORMAL
SPECIMEN DESCRIPTION: NORMAL
TRICHOMONAS VAGINALIS DNA: NEGATIVE

## 2022-10-06 RX ORDER — METRONIDAZOLE 500 MG/1
500 TABLET ORAL 2 TIMES DAILY
Qty: 14 TABLET | Refills: 0 | Status: SHIPPED | OUTPATIENT
Start: 2022-10-06 | End: 2022-10-13

## 2022-10-18 ENCOUNTER — TELEPHONE (OUTPATIENT)
Dept: OBGYN CLINIC | Age: 27
End: 2022-10-18

## 2022-10-18 DIAGNOSIS — Z3A.23 23 WEEKS GESTATION OF PREGNANCY: Primary | ICD-10-CM

## 2022-10-18 NOTE — TELEPHONE ENCOUNTER
----- Message from Steff Gaspar, Osei Carmita [place] sent at 10/18/2022  9:26 AM EDT -----  Urine showing yeast concern for contaminated culture.  Lets repeat and review how to obtained midstream clean catch thank you

## 2022-10-18 NOTE — TELEPHONE ENCOUNTER
Pt was made aware of her results and recommendations- will stop at the office tomorrow for recollect- clean catch midstream

## 2022-10-19 ENCOUNTER — HOSPITAL ENCOUNTER (OUTPATIENT)
Age: 27
Setting detail: SPECIMEN
Discharge: HOME | End: 2022-10-19

## 2022-10-19 DIAGNOSIS — Z3A.23 23 WEEKS GESTATION OF PREGNANCY: ICD-10-CM

## 2022-10-19 LAB
BACTERIA: ABNORMAL
BILIRUBIN URINE: NEGATIVE
CASTS UA: ABNORMAL /LPF (ref 0–2)
CASTS UA: ABNORMAL /LPF (ref 0–2)
COLOR: YELLOW
EPITHELIAL CELLS UA: ABNORMAL /HPF (ref 0–5)
GLUCOSE URINE: NEGATIVE
KETONES, URINE: NEGATIVE
LEUKOCYTE ESTERASE, URINE: ABNORMAL
NITRITE, URINE: NEGATIVE
PH UA: 6.5 (ref 5–8)
PROTEIN UA: NEGATIVE
RBC UA: ABNORMAL /HPF (ref 0–2)
SPECIFIC GRAVITY UA: 1 (ref 1–1.03)
TURBIDITY: CLEAR
URINE HGB: NEGATIVE
UROBILINOGEN, URINE: NORMAL
WBC UA: ABNORMAL /HPF (ref 0–5)
YEAST: ABNORMAL

## 2022-10-20 LAB
CULTURE: NORMAL
SPECIMEN DESCRIPTION: NORMAL

## 2022-10-26 ENCOUNTER — ROUTINE PRENATAL (OUTPATIENT)
Dept: OBGYN CLINIC | Age: 27
End: 2022-10-26

## 2022-10-26 VITALS — WEIGHT: 161 LBS | BODY MASS INDEX: 24.27 KG/M2 | SYSTOLIC BLOOD PRESSURE: 116 MMHG | DIASTOLIC BLOOD PRESSURE: 60 MMHG

## 2022-10-26 DIAGNOSIS — Z3A.25 25 WEEKS GESTATION OF PREGNANCY: Primary | ICD-10-CM

## 2022-10-26 DIAGNOSIS — B37.31 YEAST VAGINITIS: ICD-10-CM

## 2022-10-26 DIAGNOSIS — Z34.90 NORMAL PREGNANCY, ANTEPARTUM: ICD-10-CM

## 2022-10-26 PROCEDURE — 0502F SUBSEQUENT PRENATAL CARE: CPT | Performed by: OBSTETRICS & GYNECOLOGY

## 2022-10-26 RX ORDER — FLUCONAZOLE 150 MG/1
150 TABLET ORAL
Qty: 2 TABLET | Refills: 0 | Status: SHIPPED | OUTPATIENT
Start: 2022-10-26

## 2022-10-26 RX ORDER — GREEN TEA/HOODIA GORDONII 315-12.5MG
1 CAPSULE ORAL DAILY
Qty: 30 TABLET | Refills: 0 | Status: SHIPPED | OUTPATIENT
Start: 2022-10-26 | End: 2022-11-25

## 2022-10-27 NOTE — PROGRESS NOTES
Gustavo Saunders is a 32 y.o. female 18w2d        OB History    Para Term  AB Living   1             SAB IAB Ectopic Molar Multiple Live Births                    # Outcome Date GA Lbr Alejandro/2nd Weight Sex Delivery Anes PTL Lv   1 Current                Vitals  BP: 116/60  Weight: 161 lb (73 kg)  Patient Position: Sitting  Albumin: Negative  Glucose: Negative    The patient was seen and evaluated. There was positive fetal movements. No contractions or leakage of fluid. Signs and symptoms of  labor as well as labor were reviewed. The Nuchal Translucency testing was reviewed and found to be counseled and declined. A single marker MSAFP was reviewed and found to be counseled and declined. The patients anatomy ultrasound has been completed and reviewed with patient. TOP ST OH Reviewed. A 28 week lab panel was ordered. This includes a (HH, 1 hr GTT, U/A C&S). The patient is to complete this in the next two to four weeks. Pt is experiencing some musculoskeletal pain and pressure. Discussed prenatal PFT and she is very interested. Resources given. The S/S of Pre-Eclampsia were reviewed with the patient in detail. She is to report any of these if they occur. She currently denies any of these. The patient is RH positive Rhogam Ordered no    The patient was instructed on fetal kick counts and was given a kick sheet to complete every 8 hours. This is to begin at 28 weeks gestation. She was instructed that the baby should move at a minimum of ten times within one hour after a meal. The patient was instructed to lay down on her left side twenty minutes after eating and count movements for up to one hour with a target value of ten movements. She was instructed to notify the office if she did not make that target after two attempts or if after any attempt there was less than four movements.         Knows gender- boy  Insurance - [employer]    Plans to deliver: [facility] for delivery    FOB- Justin    1st trimester screen/NIPs: NEG cfDNA/NIPS    AFP: declined    Fetal Echo    High Risk:    Early 1 hr GTT:n/a    Covid Vaccine:    Flu Vaccine:10/5/2022     Tdap:    Rhogam:    1hr GTT:    3hr GTT:    GBS:    2nd trimester appointment with Dr. Olmstead Innocent :     3rd trimester appointment with Dr. Ishan Vinsonocent :          Assessment:  1Tomy Mtz is a 32 y.o. female  2.   3. 25w1d    Patient Active Problem List    Diagnosis Date Noted    Normal pregnancy, antepartum 2022     Priority: Medium    History of migraine headaches 2022     Priority: Medium    Astigmatism 10/04/2017     Priority: Medium    Acne vulgaris 2016     Priority: Medium    Acne 2014     Priority: Medium    Heart murmur, systolic      Priority: Medium        Diagnosis Orders   1. 25 weeks gestation of pregnancy        2. Normal pregnancy, antepartum        3. Yeast vaginitis  Probiotic Acidophilus (FLORANEX) TABS    fluconazole (DIFLUCAN) 150 MG tablet            Plan:  The patient will return to the office for her next visit in 3 weeks. See antepartum flow sheet. Patient was seen with total face to face time of 20 minutes. More than 50% of this visit was on counseling and education regarding her    Diagnosis Orders   1. 25 weeks gestation of pregnancy        2. Normal pregnancy, antepartum        3. Yeast vaginitis  Probiotic Acidophilus (FLORANEX) TABS    fluconazole (DIFLUCAN) 150 MG tablet       and her options. She was also counseled on her preventative health maintenance recommendations and follow-up.

## 2022-11-07 ENCOUNTER — HOSPITAL ENCOUNTER (OUTPATIENT)
Age: 27
Setting detail: SPECIMEN
Discharge: HOME | End: 2022-11-07

## 2022-11-07 DIAGNOSIS — Z34.90 NORMAL PREGNANCY, ANTEPARTUM: ICD-10-CM

## 2022-11-07 DIAGNOSIS — Z3A.20 20 WEEKS GESTATION OF PREGNANCY: ICD-10-CM

## 2022-11-07 LAB
ABSOLUTE EOS #: 0.18 K/UL (ref 0–0.44)
ABSOLUTE IMMATURE GRANULOCYTE: 0.2 K/UL (ref 0–0.3)
ABSOLUTE LYMPH #: 1.39 K/UL (ref 1.1–3.7)
ABSOLUTE MONO #: 0.76 K/UL (ref 0.1–1.2)
BACTERIA: ABNORMAL
BASOPHILS # BLD: 1 % (ref 0–2)
BASOPHILS ABSOLUTE: 0.08 K/UL (ref 0–0.2)
BILIRUBIN URINE: NEGATIVE
CASTS UA: ABNORMAL /LPF (ref 0–2)
CASTS UA: ABNORMAL /LPF (ref 0–2)
COLOR: YELLOW
EOSINOPHILS RELATIVE PERCENT: 1 % (ref 1–4)
EPITHELIAL CELLS UA: ABNORMAL /HPF (ref 0–5)
GLUCOSE ADMINISTRATION: NORMAL
GLUCOSE TOLERANCE SCREEN 50G: 70 MG/DL (ref 70–135)
GLUCOSE URINE: NEGATIVE
HCT VFR BLD CALC: 45.4 % (ref 36.3–47.1)
HEMOGLOBIN: 13.9 G/DL (ref 11.9–15.1)
IMMATURE GRANULOCYTES: 2 %
KETONES, URINE: NEGATIVE
LEUKOCYTE ESTERASE, URINE: ABNORMAL
LYMPHOCYTES # BLD: 11 % (ref 24–43)
MCH RBC QN AUTO: 30.8 PG (ref 25.2–33.5)
MCHC RBC AUTO-ENTMCNC: 30.6 G/DL (ref 28.4–34.8)
MCV RBC AUTO: 100.7 FL (ref 82.6–102.9)
MONOCYTES # BLD: 6 % (ref 3–12)
NITRITE, URINE: NEGATIVE
NRBC AUTOMATED: 0 PER 100 WBC
PDW BLD-RTO: 13.3 % (ref 11.8–14.4)
PH UA: 5.5 (ref 5–8)
PLATELET # BLD: 360 K/UL (ref 138–453)
PMV BLD AUTO: 9.6 FL (ref 8.1–13.5)
PROTEIN UA: NEGATIVE
RBC # BLD: 4.51 M/UL (ref 3.95–5.11)
RBC UA: ABNORMAL /HPF (ref 0–2)
SEG NEUTROPHILS: 79 % (ref 36–65)
SEGMENTED NEUTROPHILS ABSOLUTE COUNT: 10.35 K/UL (ref 1.5–8.1)
SPECIFIC GRAVITY UA: 1.02 (ref 1–1.03)
TURBIDITY: ABNORMAL
URINE HGB: ABNORMAL
UROBILINOGEN, URINE: NORMAL
WBC # BLD: 13 K/UL (ref 3.5–11.3)
WBC UA: ABNORMAL /HPF (ref 0–5)

## 2022-11-08 LAB
CULTURE: NORMAL
SPECIMEN DESCRIPTION: NORMAL

## 2022-11-23 ENCOUNTER — ROUTINE PRENATAL (OUTPATIENT)
Dept: OBGYN CLINIC | Age: 27
End: 2022-11-23
Payer: COMMERCIAL

## 2022-11-23 VITALS — SYSTOLIC BLOOD PRESSURE: 112 MMHG | WEIGHT: 170 LBS | BODY MASS INDEX: 25.62 KG/M2 | DIASTOLIC BLOOD PRESSURE: 60 MMHG

## 2022-11-23 DIAGNOSIS — Z34.90 NORMAL PREGNANCY, ANTEPARTUM: Primary | ICD-10-CM

## 2022-11-23 DIAGNOSIS — Z3A.29 29 WEEKS GESTATION OF PREGNANCY: ICD-10-CM

## 2022-11-23 DIAGNOSIS — Z23 NEED FOR DIPHTHERIA-TETANUS-PERTUSSIS (TDAP) VACCINE: ICD-10-CM

## 2022-11-23 PROCEDURE — 0502F SUBSEQUENT PRENATAL CARE: CPT | Performed by: ADVANCED PRACTICE MIDWIFE

## 2022-11-23 PROCEDURE — 90715 TDAP VACCINE 7 YRS/> IM: CPT | Performed by: ADVANCED PRACTICE MIDWIFE

## 2022-11-23 PROCEDURE — 90471 IMMUNIZATION ADMIN: CPT | Performed by: ADVANCED PRACTICE MIDWIFE

## 2022-11-23 NOTE — PROGRESS NOTES
SUBJECTIVE:    Sanchez Carlisle is here for her return OB visit. denies concerns today. She reports  feeling fetal movement. She denies vaginal bleeding. She denies vaginal discharge. She denies leaking of fluid. She denies uterine cramping. She denies  nausea and/or vomiting. OBJECTIVE:  Blood pressure 112/60, weight 170 lb (77.1 kg), last menstrual period 2022. Total weight gain: 34 lb (15.4 kg)    Sanchez Carlisle accepted the flu vaccine as appropriate. Sanchez Carlisle accepted the Tdap vaccine as appropriate      ASSESSMENT/PLAN:  IUP @ 29+0 weeks  S =D    Normal Pregnancy  Due date is based on LMP and confirmed with 8w6d early dating ultrasound  Patient's prenatal labs are completed. Patient's blood type A positive and rhogam is not indicated in pregnancy. Early glucola indicated: no  Patient accepted genetic screening. First trimester screen is completed ultrasound without any abnormal findings, risk assessment not available appears it was not drawn. Negative carrier screening. NIPT ordered 2022 - negative  Anatomy scan completed. Placenta anterior,normal cord insertion: Yes, cervical length 4.74 cm, no low lying, no previa noted. Follow up as clinically indicated. Glucola between 24-28 weeks. complete 79 Pass  Total weight gain in pregnancy reviewed: Yes  Fetal movement reviewed  GBS reviewed with patient  Reviewed signs and symptoms of  labor: yes  Reviewed signs and symptoms of labor: NA  Reviewed signs and symptoms of preeclampsia: yes  Reviewed signs and symptoms of taz hick contractions: yes  Reviewed growth ultrasound between 34-36 weeks in office: Yes unscheduled        2. 29 weeks gestation of pregnancy      3.  Need for diphtheria-tetanus-pertussis (Tdap) vaccine  - Vaccine information statement tdap edition 2020 given to patient on 2022  - Tdap, BOOSTRIX, (age 8 yrs+), IM  - KS INJECTION,THERAP/PROPH/DIAGNOST, IM OR SUBCUT        She was counseled regarding all of the above:    Return in about 2 weeks (around 12/7/2022) for Return OB.     The patient, Dayana Pastrana [facility],  was seen with a total time spent of 25 minutes for the visit on this date of service by the [facility]  On this date November 23, 2022,  I have spent greater than 50% of this visit:  [x] reviewing previous notes  [x] reviewing test results  [x] pre-charting  Discussed with patient:  [x] Importance of compliance with treatment plan  [x] Counseling  [] Coordinating care  [x] Documenting     Electronically Signed By: Lesli Harrison, Osei Ford

## 2022-11-23 NOTE — PROGRESS NOTES
The patient requested to have the TDAP vaccine. Consent obtained. Injection of 0.5mL given Right deltoid Intramuscular. Lot #:[id number]  EXP:11/30/24  Patient tolerated well.

## 2022-12-07 ENCOUNTER — ROUTINE PRENATAL (OUTPATIENT)
Dept: OBGYN CLINIC | Age: 27
End: 2022-12-07

## 2022-12-07 VITALS — WEIGHT: 171 LBS | BODY MASS INDEX: 25.77 KG/M2 | SYSTOLIC BLOOD PRESSURE: 110 MMHG | DIASTOLIC BLOOD PRESSURE: 64 MMHG

## 2022-12-07 DIAGNOSIS — Z3A.31 31 WEEKS GESTATION OF PREGNANCY: ICD-10-CM

## 2022-12-07 DIAGNOSIS — Z34.90 NORMAL PREGNANCY, ANTEPARTUM: Primary | ICD-10-CM

## 2022-12-07 PROCEDURE — 0502F SUBSEQUENT PRENATAL CARE: CPT | Performed by: ADVANCED PRACTICE MIDWIFE

## 2022-12-07 NOTE — PROGRESS NOTES
SUBJECTIVE:    Ayleen Tucker is here for her return OB visit. denies concerns today. She reports  feeling fetal movement. She denies vaginal bleeding. She denies vaginal discharge. She denies leaking of fluid. She denies uterine cramping. She denies  nausea and/or vomiting. OBJECTIVE:  Blood pressure 110/64, weight 171 lb (77.6 kg), last menstrual period 2022. Total weight gain: 35 lb (15.9 kg)    Ayleen Tucker accepted the flu vaccine as appropriate. Ayleen Hearing accepted the Tdap vaccine as appropriate  Ayleen Hearing accepted the COVID vaccine as appropriate    ASSESSMENT/PLAN:  IUP @ 31+0 weeks  S =D    Normal Pregnancy  Due date is based on LMP and confirmed with 8w6d early dating ultrasound  Patient's prenatal labs are completed. Patient's blood type A positive and rhogam is not indicated in pregnancy. Early glucola indicated: no  Patient accepted genetic screening. First trimester screen is completed ultrasound without any abnormal findings, risk assessment not available appears it was not drawn. Negative carrier screening. NIPT ordered 2022 - negative  Anatomy scan completed. Placenta anterior,normal cord insertion: Yes, cervical length 4.74 cm, no low lying, no previa noted. Follow up as clinically indicated. Glucola between 24-28 weeks. complete 70 Pass  Total weight gain in pregnancy reviewed: Yes  Fetal Kick Count was discussed and explained. She was instructed to lay on her left side 20 minutes after eating and count movements for up to 2 hours with a target value of 10 movements. Instructed to notify office if she does not make the target.   GBS at 36 weeks  Reviewed signs and symptoms of  labor: yes  Reviewed signs and symptoms of labor: NA  Reviewed signs and symptoms of preeclampsia: yes  Reviewed signs and symptoms of taz hick contractions: yes  Reviewed growth ultrasound between 34-36 weeks in office: Yes scheduled  Reviewed birth preferences: yes    2. 31 weeks gestation of pregnancy  - Reviewed pain management in labor in detail. Patient planning for epidural        She was counseled regarding all of the above:    Return in about 2 weeks (around 12/21/2022) for Return OB.     The patient, Jerrod \[facility] [place] [facility]\""kerry [facility],  was seen with a total time spent of 25 minutes for the visit on this date of service by the [facility]  On this date December 7, 2022,  I have spent greater than 50% of this visit:  [x] reviewing previous notes  [x] reviewing test results  [x] pre-charting  Discussed with patient:  [x] Importance of compliance with treatment plan  [x] Counseling  [] Coordinating care  [x] Documenting     Electronically Signed By: Osei Gonzalez

## 2022-12-28 ENCOUNTER — ROUTINE PRENATAL (OUTPATIENT)
Dept: OBGYN CLINIC | Age: 27
End: 2022-12-28

## 2022-12-28 ENCOUNTER — HOSPITAL ENCOUNTER (OUTPATIENT)
Age: 27
Setting detail: SPECIMEN
Discharge: HOME | End: 2022-12-28

## 2022-12-28 VITALS — WEIGHT: 178 LBS | DIASTOLIC BLOOD PRESSURE: 84 MMHG | SYSTOLIC BLOOD PRESSURE: 122 MMHG | BODY MASS INDEX: 26.83 KG/M2

## 2022-12-28 DIAGNOSIS — Z3A.34 34 WEEKS GESTATION OF PREGNANCY: Primary | ICD-10-CM

## 2022-12-28 DIAGNOSIS — N89.8 VAGINAL DISCHARGE: ICD-10-CM

## 2022-12-28 DIAGNOSIS — Z3A.25 25 WEEKS GESTATION OF PREGNANCY: ICD-10-CM

## 2022-12-28 DIAGNOSIS — Z34.90 NORMAL PREGNANCY, ANTEPARTUM: ICD-10-CM

## 2022-12-28 DIAGNOSIS — R35.0 URINARY FREQUENCY: ICD-10-CM

## 2022-12-28 PROCEDURE — 0502F SUBSEQUENT PRENATAL CARE: CPT | Performed by: OBSTETRICS & GYNECOLOGY

## 2022-12-29 LAB
CANDIDA SPECIES, DNA PROBE: POSITIVE
CULTURE: NORMAL
GARDNERELLA VAGINALIS, DNA PROBE: POSITIVE
SOURCE: ABNORMAL
SPECIMEN DESCRIPTION: NORMAL
TRICHOMONAS VAGINALIS DNA: NEGATIVE

## 2022-12-29 RX ORDER — FLUCONAZOLE 150 MG/1
150 TABLET ORAL
Qty: 2 TABLET | Refills: 0 | Status: SHIPPED | OUTPATIENT
Start: 2022-12-29

## 2022-12-29 RX ORDER — GREEN TEA/HOODIA GORDONII 315-12.5MG
1 CAPSULE ORAL DAILY
Qty: 60 TABLET | Refills: 11 | Status: SHIPPED | OUTPATIENT
Start: 2022-12-29 | End: 2023-01-28

## 2022-12-29 NOTE — PROGRESS NOTES
Brianne Olmos is a 32 y.o. female 34w3d        OB History    Para Term  AB Living   1             SAB IAB Ectopic Molar Multiple Live Births                    # Outcome Date GA Lbr Alejandro/2nd Weight Sex Delivery Anes PTL Lv   1 Current                Vitals  BP: 122/84  Weight: 178 lb (80.7 kg)  Patient Position: Sitting  Albumin: Negative  Glucose: Negative      The patient was seen and evaluated. There was positive fetal movements. No contractions. Signs and symptoms of  labor as well as labor were reviewed. The S/S of Pre-Eclampsia were reviewed with the patient in detail. She is to report any of these if they occur. She currently complains of leakage for fluid when she wakes from sleep and occasionally small amount of discharge throughout day that is clear or sometimes yellow/green. Denies vaginal discharge odor. Denies recent intercourse. The patient had her 28 week labs completed. Hospital Outpatient Visit on 2022   Component Date Value Ref Range Status    Source 2022 . VAGINAL SWAB   Final    Trichomonas Vaginalis DNA 2022 NEGATIVE  NEGATIVE Final    for Trichomonas Vaginalis    Gardnerella Vaginalis, DNA Probe 2022 POSITIVE (A)  NEGATIVE Final    for Gardnerella vaginalis    Candida Species, DNA Probe 2022 POSITIVE (A)  NEGATIVE Final    Comment: for Candida sp.   Method of testing is a DNA probe intended for detection and identification of Candida   species, Gardnerella vaginalis, and Trichomonas vaginalis nucleic acid in vaginal fluid   specimens from patients with symptoms of vaginitis/vaginosis.     ]      Knows gender- boy  Insurance - [facility]    Plans to deliver: [facility] for delivery    FOB- Justin    1st trimester screen/NIPs: NEG cfDNA/NIPS    AFP: declined    Fetal Echo    High Risk:    Early 1 hr GTT: n/a    Covid Vaccine: yes, no boosters    Flu Vaccine:10/5/2022     Tdap: given 22    Rhogam: n/a, A+    1hr GTT: passed, 70    3hr GTT:    GBS:    2nd trimester appointment with Dr. Perez Schools :     3rd trimester appointment with Dr. Perez Schools :          T-Dap Vaccine (27-36 weeks): completed    The patient was instructed on fetal kick counts and was given a kick sheet to complete every 8 hours. She was instructed that the baby should move at a minimum of ten times within one hour after a meal. The patient was instructed to lay down on her left side twenty minutes after eating and count movements for up to one hour with a target value of ten movements. She was instructed to notify the office if she did not make that target after two attempts or if after any attempt there was less than four movements. The patient reports that the targets have been made Yes.  Testing:  None    Refer to ultrasound report in media section  MICKEY 9.1  Cephalic    Assessment:  1. Wilburt Mohs is a 32 y.o. female  2.   3. 34w1d    Patient Active Problem List    Diagnosis Date Noted    Normal pregnancy, antepartum 2022     Priority: Medium    History of migraine headaches 2022     Priority: Medium    Astigmatism 10/04/2017     Priority: Medium    Acne vulgaris 2016     Priority: Medium    Acne 2014     Priority: Medium    Heart murmur, systolic      Priority: Medium        Diagnosis Orders   1. 34 weeks gestation of pregnancy        2. Normal pregnancy, antepartum        3. Vaginal discharge  Vaginitis DNA Probe      4. Urinary frequency  Culture, Urine                Plan:  The patient will return to the office for her next visit in 2 weeks. See antepartum flow sheet. Nitrazine neg, no pooling, neg valsalva  Positive light green thick discharge removed from vaginal canal and cultured. Diflucan, vaginal cultures, probiotic recommended. Low suspicion for PPROM but if concerns continue report to L&D for amniosure and repeat evaluation.        Testing Indicated: No  Scheduled with Nursing-Pt notified: No      Patient was seen with total face to face time of 20 minutes. More than 50% of this visit was on counseling and education regarding her    Diagnosis Orders   1. 34 weeks gestation of pregnancy        2. Normal pregnancy, antepartum        3. Vaginal discharge  Vaginitis DNA Probe      4. Urinary frequency  Culture, Urine       and her options. She was also counseled on her preventative health maintenance recommendations and follow-up.

## 2023-01-11 ENCOUNTER — ROUTINE PRENATAL (OUTPATIENT)
Dept: OBGYN CLINIC | Age: 28
End: 2023-01-11

## 2023-01-11 ENCOUNTER — HOSPITAL ENCOUNTER (OUTPATIENT)
Age: 28
Setting detail: SPECIMEN
Discharge: HOME | End: 2023-01-11

## 2023-01-11 VITALS — BODY MASS INDEX: 27.13 KG/M2 | WEIGHT: 180 LBS | SYSTOLIC BLOOD PRESSURE: 120 MMHG | DIASTOLIC BLOOD PRESSURE: 76 MMHG

## 2023-01-11 DIAGNOSIS — Z3A.36 36 WEEKS GESTATION OF PREGNANCY: ICD-10-CM

## 2023-01-11 DIAGNOSIS — Z34.90 NORMAL PREGNANCY, ANTEPARTUM: Primary | ICD-10-CM

## 2023-01-11 PROCEDURE — 0502F SUBSEQUENT PRENATAL CARE: CPT | Performed by: ADVANCED PRACTICE MIDWIFE

## 2023-01-11 NOTE — PROGRESS NOTES
SUBJECTIVE:    Jay Bravo is here for her return OB visit. denies concerns today. She reports  feeling fetal movement. She denies vaginal bleeding. She denies vaginal discharge. She denies leaking of fluid. She denies uterine cramping. She denies  nausea and/or vomiting. OBJECTIVE:  Blood pressure 120/76, weight 180 lb (81.6 kg), last menstrual period 2022. Total weight gain: 44 lb (20 kg)    Jay Bravo accepted the flu vaccine as appropriate. Jay Bravo accepted the Tdap vaccine as appropriate  Jay Bravo accepted the COVID vaccine as appropriate    ASSESSMENT/PLAN:  IUP @ 36+0 weeks  S =D    Normal Pregnancy  Due date is based on LMP and confirmed with 8w6d early dating ultrasound  Patient's prenatal labs are completed. Patient's blood type A positive and rhogam is not indicated in pregnancy. Early glucola indicated: no  Patient accepted genetic screening. First trimester screen is completed ultrasound without any abnormal findings, risk assessment not available appears it was not drawn. Negative carrier screening. NIPT ordered 2022 - negative  Anatomy scan completed. Placenta anterior,normal cord insertion: Yes, cervical length 4.74 cm, no low lying, no previa noted. Follow up as clinically indicated. Glucola between 24-28 weeks. complete 70 Pass  Total weight gain in pregnancy reviewed: Yes  Fetal Kick Count was discussed and explained. She was instructed to lay on her left side 20 minutes after eating and count movements for up to 2 hours with a target value of 10 movements. Instructed to notify office if she does not make the target. GBS ordered and was collected at visit  Reviewed signs and symptoms of  labor: yes  Reviewed signs and symptoms of labor: NA  Reviewed signs and symptoms of preeclampsia: yes  Reviewed signs and symptoms of taz hick contractions: yes  Reviewed growth ultrasound between 34-36 weeks in office: Yes  completed prior to visit. Growth 22 EFW 50.3%   Reviewed growth ultrasound and BPP if not delivered by 40 weeks: Yes  Reviewed birth preferences: yes    2. 36 weeks gestation of pregnancy  - Strep B screen; Future        She was counseled regarding all of the above:    Return in about 1 week (around 1/18/2023) for Return OB.     The patient, Julius Lockwood,  was seen with a total time spent of 25 minutes for the visit on this date of service by the [facility]  On this date January 11, 2023,  I have spent greater than 50% of this visit:  [x] reviewing previous notes  [x] reviewing test results  [x] pre-charting  Discussed with patient:  [x] Importance of compliance with treatment plan  [x] Counseling  [] Coordinating care  [x] Documenting     Electronically Signed By: LEWIS Medeiros CNM

## 2023-01-12 LAB
DIRECT EXAM: NORMAL
SPECIMEN DESCRIPTION: NORMAL

## 2023-01-17 ENCOUNTER — ROUTINE PRENATAL (OUTPATIENT)
Dept: OBGYN CLINIC | Age: 28
End: 2023-01-17

## 2023-01-17 VITALS — BODY MASS INDEX: 27.43 KG/M2 | WEIGHT: 182 LBS | DIASTOLIC BLOOD PRESSURE: 60 MMHG | SYSTOLIC BLOOD PRESSURE: 120 MMHG

## 2023-01-17 DIAGNOSIS — Z3A.36 36 WEEKS GESTATION OF PREGNANCY: ICD-10-CM

## 2023-01-17 DIAGNOSIS — Z34.90 NORMAL PREGNANCY, ANTEPARTUM: Primary | ICD-10-CM

## 2023-01-17 PROCEDURE — 0502F SUBSEQUENT PRENATAL CARE: CPT | Performed by: ADVANCED PRACTICE MIDWIFE

## 2023-01-17 NOTE — PROGRESS NOTES
SUBJECTIVE:    Little Reyes is here for her return OB visit. denies concerns today. She reports  feeling fetal movement. She denies vaginal bleeding. She denies vaginal discharge. She denies leaking of fluid. She denies uterine cramping. She denies  nausea and/or vomiting. OBJECTIVE:  Blood pressure 120/60, weight 182 lb (82.6 kg), last menstrual period 2022. Total weight gain: 46 lb (20.9 kg)    Little Reyes accepted the flu vaccine as appropriate. Little Reyes accepted the Tdap vaccine as appropriate  Little Reyes accepted the COVID vaccine as appropriate    ASSESSMENT/PLAN:  IUP @ 36+6 weeks  S =D    Normal Pregnancy  Due date is based on LMP and confirmed with 8w6d early dating ultrasound  Patient's prenatal labs are completed. Patient's blood type A positive and rhogam is not indicated in pregnancy. Early glucola indicated: no  Patient accepted genetic screening. First trimester screen is completed ultrasound without any abnormal findings, risk assessment not available appears it was not drawn. Negative carrier screening. NIPT ordered 2022 - negative  Anatomy scan completed. Placenta anterior,normal cord insertion: Yes, cervical length 4.74 cm, no low lying, no previa noted. Follow up as clinically indicated. Glucola between 24-28 weeks. complete 70 Pass  Total weight gain in pregnancy reviewed: Yes  Fetal Kick Count was discussed and explained. She was instructed to lay on her left side 20 minutes after eating and count movements for up to 2 hours with a target value of 10 movements. Instructed to notify office if she does not make the target.   GBS completed and was Negative  Reviewed signs and symptoms of  labor: yes  Reviewed signs and symptoms of preeclampsia: yes  Reviewed signs and symptoms of taz hick contractions: yes  Reviewed growth ultrasound between 34-36 weeks in office: Yes  completed  Reviewed growth ultrasound and BPP if not delivered by 40 weeks: Yes  Reviewed birth preferences: yes    2. 36 weeks gestation of pregnancy          She was counseled regarding all of the above:    Return in about 1 week (around 1/24/2023) for Return OB.     The patient, Mariama Stinson,  was seen with a total time spent of 25 minutes for the visit on this date of service by the [facility]  On this date January 17, 2023,  I have spent greater than 50% of this visit:  [x] reviewing previous notes  [x] reviewing test results  [x] pre-charting  Discussed with patient:  [x] Importance of compliance with treatment plan  [x] Counseling  [] Coordinating care  [x] Documenting     Electronically Signed By: LEWIS Vann CNM

## 2023-01-25 ENCOUNTER — ROUTINE PRENATAL (OUTPATIENT)
Dept: OBGYN CLINIC | Age: 28
End: 2023-01-25

## 2023-01-25 VITALS — DIASTOLIC BLOOD PRESSURE: 72 MMHG | SYSTOLIC BLOOD PRESSURE: 118 MMHG | WEIGHT: 183 LBS | BODY MASS INDEX: 27.58 KG/M2

## 2023-01-25 DIAGNOSIS — Z3A.38 38 WEEKS GESTATION OF PREGNANCY: ICD-10-CM

## 2023-01-25 DIAGNOSIS — Z34.90 NORMAL PREGNANCY, ANTEPARTUM: Primary | ICD-10-CM

## 2023-01-25 PROCEDURE — 0502F SUBSEQUENT PRENATAL CARE: CPT | Performed by: ADVANCED PRACTICE MIDWIFE

## 2023-01-25 NOTE — PROGRESS NOTES
SUBJECTIVE:    Jolie Duran is here for her return OB visit. denies concerns today. She reports  feeling fetal movement. She denies vaginal bleeding. She denies vaginal discharge. She denies leaking of fluid. She denies uterine cramping. She denies  nausea and/or vomiting. OBJECTIVE:  Blood pressure 118/72, weight 183 lb (83 kg), last menstrual period 2022. Total weight gain: 47 lb (21.3 kg)    Jolie Duran accepted the flu vaccine as appropriate. Jolie Duran accepted the Tdap vaccine as appropriate  Jolie Duran accepted the COVID vaccine as appropriate    ASSESSMENT/PLAN:  IUP @ 38+0 weeks  S =D    Normal Pregnancy  Due date is based on LMP and confirmed with 8w6d early dating ultrasound  Patient's prenatal labs are completed. Patient's blood type A positive and rhogam is not indicated in pregnancy. Early glucola indicated: no  Patient accepted genetic screening. First trimester screen is completed ultrasound without any abnormal findings, risk assessment not available appears it was not drawn. Negative carrier screening. NIPT ordered 2022 - negative  Anatomy scan completed. Placenta anterior,normal cord insertion: Yes, cervical length 4.74 cm, no low lying, no previa noted. Follow up as clinically indicated. Glucola between 24-28 weeks. complete 70 Pass  Total weight gain in pregnancy reviewed: Yes  Fetal Kick Count was discussed and explained. She was instructed to lay on her left side 20 minutes after eating and count movements for up to 2 hours with a target value of 10 movements. Instructed to notify office if she does not make the target.   GBS completed and was Negative  Reviewed signs and symptoms of  labor: yes  Reviewed signs and symptoms of preeclampsia: yes  Reviewed signs and symptoms of taz hick contractions: yes  Reviewed growth ultrasound between 34-36 weeks in office: Yes  completed 22 EFW 50.3%   Reviewed growth ultrasound and BPP if not delivered by 40 weeks: Yes  Reviewed birth preferences: yes    2. 38 weeks gestation of pregnancy          She was counseled regarding all of the above:    Return in about 1 week (around 2/1/2023) for Return OB.     The patient, Elissa Andrew,  was seen with a total time spent of 25 minutes for the visit on this date of service by the [facility]  On this date January 25, 2023,  I have spent greater than 50% of this visit:  [x] reviewing previous notes  [x] reviewing test results  [x] pre-charting  Discussed with patient:  [x] Importance of compliance with treatment plan  [x] Counseling  [] Coordinating care  [x] Documenting     Electronically Signed By: LEWIS Boone CNM

## 2023-02-01 ENCOUNTER — ROUTINE PRENATAL (OUTPATIENT)
Dept: OBGYN CLINIC | Age: 28
End: 2023-02-01

## 2023-02-01 VITALS — SYSTOLIC BLOOD PRESSURE: 116 MMHG | DIASTOLIC BLOOD PRESSURE: 64 MMHG | BODY MASS INDEX: 28.03 KG/M2 | WEIGHT: 186 LBS

## 2023-02-01 DIAGNOSIS — Z3A.39 39 WEEKS GESTATION OF PREGNANCY: ICD-10-CM

## 2023-02-01 DIAGNOSIS — Z34.90 NORMAL PREGNANCY, ANTEPARTUM: Primary | ICD-10-CM

## 2023-02-01 PROCEDURE — 0502F SUBSEQUENT PRENATAL CARE: CPT | Performed by: ADVANCED PRACTICE MIDWIFE

## 2023-02-01 NOTE — PROGRESS NOTES
SUBJECTIVE:    Светлана Crowell is here for her return OB visit. denies concerns today. Patient requesting induction at 40 weeks  She reports  feeling fetal movement. She denies vaginal bleeding. She denies vaginal discharge. She denies leaking of fluid. She denies uterine cramping. She denies  nausea and/or vomiting. OBJECTIVE:  Blood pressure 116/64, weight 186 lb (84.4 kg), last menstrual period 2022. Total weight gain: 50 lb (22.7 kg)    Светлана Crowell accepted the flu vaccine as appropriate. Светлана Crowell accepted the Tdap vaccine as appropriate  Светлана Crowell accepted the COVID vaccine as appropriate    ASSESSMENT/PLAN:  IUP @ 39+0 weeks  S =D    Normal Pregnancy  Due date is based on LMP and confirmed with 8w6d early dating ultrasound  Patient's prenatal labs are completed. Patient's blood type A positive and rhogam is not indicated in pregnancy. Early glucola indicated: no  Patient accepted genetic screening. First trimester screen is completed ultrasound without any abnormal findings, risk assessment not available appears it was not drawn. Negative carrier screening. NIPT ordered 2022 - negative  Anatomy scan completed. Placenta anterior,normal cord insertion: Yes, cervical length 4.74 cm, no low lying, no previa noted. Follow up as clinically indicated. Glucola between 24-28 weeks. complete 70 Pass  Total weight gain in pregnancy reviewed: Yes  Fetal Kick Count was discussed and explained. She was instructed to lay on her left side 20 minutes after eating and count movements for up to 2 hours with a target value of 10 movements. Instructed to notify office if she does not make the target.   GBS completed and was Negative  Reviewed signs and symptoms of  labor: yes  Reviewed signs and symptoms of preeclampsia: yes  Reviewed signs and symptoms of taz hick contractions: yes  Reviewed growth ultrasound between 34-36 weeks in office: Yes  completed 22 EFW 50.3%   Reviewed growth ultrasound and BPP if not delivered by 40 weeks: Yes  Reviewed birth preferences: yes  Per Saleem Garcia RN labor and delivery cannot schedule elective induction until 2/10/22 patient scheduled for 11am will be 40w2d    2. 39 weeks gestation of pregnancy        She was counseled regarding all of the above:    Return in about 1 week (around 2/8/2023) for Return OB.     The patient, Elvin Rogers,  was seen with a total time spent of 25 minutes for the visit on this date of service by the [facility]  On this date February 1, 2023,  I have spent greater than 50% of this visit:  [x] reviewing previous notes  [x] reviewing test results  [x] pre-charting  Discussed with patient:  [x] Importance of compliance with treatment plan  [x] Counseling  [] Coordinating care  [x] Documenting     Electronically Signed By: LEWIS Brooke CNM

## 2023-02-07 ENCOUNTER — ROUTINE PRENATAL (OUTPATIENT)
Dept: OBGYN CLINIC | Age: 28
End: 2023-02-07

## 2023-02-07 ENCOUNTER — HOSPITAL ENCOUNTER (INPATIENT)
Age: 28
LOS: 3 days | Discharge: HOME | End: 2023-02-10
Attending: OBSTETRICS & GYNECOLOGY | Admitting: SPECIALIST
Payer: COMMERCIAL

## 2023-02-07 VITALS — BODY MASS INDEX: 27.88 KG/M2 | SYSTOLIC BLOOD PRESSURE: 120 MMHG | DIASTOLIC BLOOD PRESSURE: 70 MMHG | WEIGHT: 185 LBS

## 2023-02-07 DIAGNOSIS — Z34.90 NORMAL PREGNANCY, ANTEPARTUM: Primary | ICD-10-CM

## 2023-02-07 DIAGNOSIS — Z3A.39 39 WEEKS GESTATION OF PREGNANCY: ICD-10-CM

## 2023-02-07 LAB
ABO/RH: NORMAL
ABSOLUTE EOS #: 0.03 K/UL (ref 0–0.44)
ABSOLUTE IMMATURE GRANULOCYTE: 0.19 K/UL (ref 0–0.3)
ABSOLUTE LYMPH #: 1.45 K/UL (ref 1.1–3.7)
ABSOLUTE MONO #: 0.69 K/UL (ref 0.1–1.2)
ANTIBODY SCREEN: NEGATIVE
ARM BAND NUMBER: NORMAL
BASOPHILS # BLD: 0 % (ref 0–2)
BASOPHILS ABSOLUTE: 0.05 K/UL (ref 0–0.2)
EOSINOPHILS RELATIVE PERCENT: 0 % (ref 1–4)
EXPIRATION DATE: NORMAL
HCT VFR BLD AUTO: 43.3 % (ref 36.3–47.1)
HGB BLD-MCNC: 14.6 G/DL (ref 11.9–15.1)
IMMATURE GRANULOCYTES: 2 %
LYMPHOCYTES # BLD: 12 % (ref 24–43)
MCH RBC QN AUTO: 30.2 PG (ref 25.2–33.5)
MCHC RBC AUTO-ENTMCNC: 33.7 G/DL (ref 28.4–34.8)
MCV RBC AUTO: 89.6 FL (ref 82.6–102.9)
MONOCYTES # BLD: 6 % (ref 3–12)
NRBC AUTOMATED: 0 PER 100 WBC
PDW BLD-RTO: 13.5 % (ref 11.8–14.4)
PLATELET # BLD AUTO: ABNORMAL K/UL (ref 138–453)
PLATELET, FLUORESCENCE: NORMAL K/UL (ref 138–453)
RBC # BLD: 4.83 M/UL (ref 3.95–5.11)
SEG NEUTROPHILS: 81 % (ref 36–65)
SEGMENTED NEUTROPHILS ABSOLUTE COUNT: 10.08 K/UL (ref 1.5–8.1)
T PALLIDUM AB SER QL IA: NONREACTIVE
WBC # BLD AUTO: 12.5 K/UL (ref 3.5–11.3)

## 2023-02-07 PROCEDURE — 85055 RETICULATED PLATELET ASSAY: CPT

## 2023-02-07 PROCEDURE — 86900 BLOOD TYPING SEROLOGIC ABO: CPT

## 2023-02-07 PROCEDURE — 85025 COMPLETE CBC W/AUTO DIFF WBC: CPT

## 2023-02-07 PROCEDURE — 86901 BLOOD TYPING SEROLOGIC RH(D): CPT

## 2023-02-07 PROCEDURE — 80307 DRUG TEST PRSMV CHEM ANLYZR: CPT

## 2023-02-07 PROCEDURE — 1220000000 HC SEMI PRIVATE OB R&B

## 2023-02-07 PROCEDURE — 86780 TREPONEMA PALLIDUM: CPT

## 2023-02-07 PROCEDURE — 2580000003 HC RX 258

## 2023-02-07 PROCEDURE — 6370000000 HC RX 637 (ALT 250 FOR IP): Performed by: STUDENT IN AN ORGANIZED HEALTH CARE EDUCATION/TRAINING PROGRAM

## 2023-02-07 PROCEDURE — 0502F SUBSEQUENT PRENATAL CARE: CPT | Performed by: ADVANCED PRACTICE MIDWIFE

## 2023-02-07 PROCEDURE — 86850 RBC ANTIBODY SCREEN: CPT

## 2023-02-07 RX ORDER — ONDANSETRON 2 MG/ML
4 INJECTION INTRAMUSCULAR; INTRAVENOUS EVERY 6 HOURS PRN
Status: DISCONTINUED | OUTPATIENT
Start: 2023-02-07 | End: 2023-02-09

## 2023-02-07 RX ORDER — SODIUM CHLORIDE, SODIUM LACTATE, POTASSIUM CHLORIDE, CALCIUM CHLORIDE 600; 310; 30; 20 MG/100ML; MG/100ML; MG/100ML; MG/100ML
INJECTION, SOLUTION INTRAVENOUS CONTINUOUS
Status: DISCONTINUED | OUTPATIENT
Start: 2023-02-07 | End: 2023-02-09

## 2023-02-07 RX ORDER — SODIUM CHLORIDE, SODIUM LACTATE, POTASSIUM CHLORIDE, AND CALCIUM CHLORIDE .6; .31; .03; .02 G/100ML; G/100ML; G/100ML; G/100ML
1000 INJECTION, SOLUTION INTRAVENOUS PRN
Status: DISCONTINUED | OUTPATIENT
Start: 2023-02-07 | End: 2023-02-09

## 2023-02-07 RX ORDER — DIPHENHYDRAMINE HCL 25 MG
25 TABLET ORAL EVERY 4 HOURS PRN
Status: DISCONTINUED | OUTPATIENT
Start: 2023-02-07 | End: 2023-02-09

## 2023-02-07 RX ORDER — ACETAMINOPHEN 500 MG
1000 TABLET ORAL EVERY 6 HOURS PRN
Status: DISCONTINUED | OUTPATIENT
Start: 2023-02-07 | End: 2023-02-09

## 2023-02-07 RX ORDER — SODIUM CHLORIDE 0.9 % (FLUSH) 0.9 %
5-40 SYRINGE (ML) INJECTION PRN
Status: DISCONTINUED | OUTPATIENT
Start: 2023-02-07 | End: 2023-02-09

## 2023-02-07 RX ORDER — SODIUM CHLORIDE 9 MG/ML
25 INJECTION, SOLUTION INTRAVENOUS PRN
Status: DISCONTINUED | OUTPATIENT
Start: 2023-02-07 | End: 2023-02-09

## 2023-02-07 RX ORDER — SODIUM CHLORIDE, SODIUM LACTATE, POTASSIUM CHLORIDE, AND CALCIUM CHLORIDE .6; .31; .03; .02 G/100ML; G/100ML; G/100ML; G/100ML
500 INJECTION, SOLUTION INTRAVENOUS PRN
Status: DISCONTINUED | OUTPATIENT
Start: 2023-02-07 | End: 2023-02-09

## 2023-02-07 RX ORDER — SODIUM CHLORIDE 0.9 % (FLUSH) 0.9 %
5-40 SYRINGE (ML) INJECTION EVERY 12 HOURS SCHEDULED
Status: DISCONTINUED | OUTPATIENT
Start: 2023-02-07 | End: 2023-02-09

## 2023-02-07 RX ADMIN — SODIUM CHLORIDE, POTASSIUM CHLORIDE, SODIUM LACTATE AND CALCIUM CHLORIDE: 600; 310; 30; 20 INJECTION, SOLUTION INTRAVENOUS at 18:13

## 2023-02-07 RX ADMIN — DINOPROSTONE 10 MG: 10 INSERT VAGINAL at 18:21

## 2023-02-07 NOTE — DISCHARGE SUMMARY
Obstetric Discharge Summary  [facility]    Patient Name: Karan Archuleta  Patient : 1995  Primary Care Physician: MATHIEU Peñaloza  Admit Date: 2023    Principal Diagnosis: IUP at 39w6d, admitted for IOL  BPP /8 on 23    Her pregnancy has been complicated by:   Patient Active Problem List   Diagnosis    Acne    Astigmatism    Heart Murmur    Hx Migraines     23 M Apg  Wt 8#3    gHTN (G1)     (spontaneous vaginal delivery)       Infection Present?: No  Hospital Acquired: No    Surgical Operations & Procedures:  Analgesia: epidural  Delivery Type: Spontaneous Vaginal Delivery: See Labor and Delivery Summary   Laceration(s): Absent    Consultations: Anesthesia    Pertinent Findings & Procedures:   Karan Archuleta is a 32 y.o. female  at 37w11d admitted for an induction of labor  a BPP of  off for tone; received cervidil x1, Gibson balloon, pitocin per protocol, morphine x2, compazine x1, epidural, AROM (clr), IUPC. Patient met criteria for gHTN during admission. Pre E labs were wnl, P/C 0.15. She delivered by spontaneous vaginal a Live Born infant on 23. Information for the patient's :  Armand, Baby Boy Ирина Caraballo [[id number]]   male   Birth Weight: 8 lb 3.8 oz (3.735 kg)     Apgars: 8 at 1 minute and 9 at 5 minutes.    WBC trended down 26.5>15.9    Postpartum course: normal.      Course of patient: uncomplicated    Discharge to: Home    Readmission planned: no     Recommendations on Discharge:     Medications:      Medication List        START taking these medications      acetaminophen 500 MG tablet  Commonly known as: TYLENOL  Take 2 tablets by mouth every 6 hours as needed for Pain     ibuprofen 600 MG tablet  Commonly known as: ADVIL;MOTRIN  Take 1 tablet by mouth every 6 hours as needed for Pain     sennosides-docusate sodium 8.6-50 MG tablet  Commonly known as: SENOKOT-S  Take 1 tablet by mouth daily CONTINUE taking these medications      PNV-DHA PO            STOP taking these medications      fluconazole 150 MG tablet  Commonly known as: Diflucan               Where to Get Your Medications        These medications were sent to [facility] [address] [facility], [address] [place] [address]      Phone: [phone]   acetaminophen 500 MG tablet  ibuprofen 600 MG tablet  sennosides-docusate sodium 8.6-50 MG tablet           Activity: pelvic rest x 6 weeks, no lifting greater than 15 lbs  Diet: regular diet  Follow up: 2 weeks for BP check    Condition on discharge: stable    Discharge date: 2/10/23    Sabina Quintero DO  Ob/Gyn Resident    Comments:  Home care and follow-up care were reviewed. Pelvic rest, and birth control were reviewed. Signs and symptoms of mastitis and post partum depression were reviewed. The patient is to notify her physician if any of these occur. The patient was counseled on secondary smoke risks and the increased risk of sudden infant death syndrome and respiratory problems to her baby with exposure. She was counseled on various alternate recommendations to decrease the exposure to secondary smoke to her children.

## 2023-02-07 NOTE — PROGRESS NOTES
SUBJECTIVE:    Lola Pantoja is here for her return OB visit. denies concerns today. She reports  feeling fetal movement. She denies vaginal bleeding. She denies vaginal discharge. She denies leaking of fluid. She denies uterine cramping. She denies  nausea and/or vomiting. OBJECTIVE:  Blood pressure 120/70, weight 185 lb (83.9 kg), last menstrual period 2022. Total weight gain: 49 lb (22.2 kg)      ASSESSMENT/PLAN:  IUP @ 39+6 weeks  S =D    Normal Pregnancy  Due date is based on LMP and confirmed with 8w6d early dating ultrasound  Patient's prenatal labs are completed. Patient's blood type A positive and rhogam is not indicated in pregnancy. Early glucola indicated: no  Patient accepted genetic screening. First trimester screen is completed ultrasound without any abnormal findings, risk assessment not available appears it was not drawn. Negative carrier screening. NIPT ordered 2022 - negative  Anatomy scan completed. Placenta anterior,normal cord insertion: Yes, cervical length 4.74 cm, no low lying, no previa noted. Follow up as clinically indicated. Glucola between 24-28 weeks. complete 70 Pass  Total weight gain in pregnancy reviewed: Yes  Fetal Kick Count was discussed and explained. She was instructed to lay on her left side 20 minutes after eating and count movements for up to 2 hours with a target value of 10 movements. Instructed to notify office if she does not make the target. GBS completed and was Negative  Reviewed signs and symptoms of  labor: yes  Reviewed signs and symptoms of preeclampsia: yes  Reviewed signs and symptoms of taz hick contractions: yes  Reviewed growth ultrasound between 34-36 weeks in office: Yes  completed 22 EFW 50.3%   Reviewed growth ultrasound and BPP if not delivered by 40 weeks: Yes  Reviewed birth preferences: yes    2. 39 weeks gestation of pregnancy  - 39w6d had 40 week ultrasound growth with BPP. BPP  off for tone.  Sent to labor and delivery for further evaluation. On call provider  notified by provider. Labor and delivery notified by provider. She was counseled regarding all of the above:         The patient, Feliicta Castaneda,  was seen with a total time spent of 25 minutes for the visit on this date of service by the [facility]  On this date February 7, 2023,  I have spent greater than 50% of this visit:  [x] reviewing previous notes  [x] reviewing test results  [x] pre-charting  Discussed with patient:  [x] Importance of compliance with treatment plan  [x] Counseling  [] Coordinating care  [x] Documenting     Electronically Signed By: LEWIS Ramos CNM

## 2023-02-07 NOTE — PROGRESS NOTES
Labor Progress Note    Adán Acosta is a 32 y.o. female  at 37w11d  The patient was seen and examined. Her pain is well controlled. She reports fetal movement is present, denies contractions, denies loss of fluid, denies vaginal bleeding. Cervidil placed and patient tolerated well.     Vital Signs:  Vitals:    23 1505 23 1507   BP: 131/84    Pulse: (!) 103    Resp: 20    Temp: 98.4 °F (36.9 °C)    TempSrc: Oral    Weight:  185 lb (83.9 kg)   Height:  5' 8\" (1.727 m)         FHT:  135 , moderate variability, accelerations absent decelerations absent  Contractions: none    Chaperone for Intimate Exam: Chaperone was present for entire exam, Chaperone Name: Angela Canales RN  Cervical Exam: 1 cm dilated, 50 effaced, -2 station  Pitocin: @ 0 mu/min    Membranes: Intact  Scalp Electrode in place: absent  Intrauterine Pressure Catheter in Place: absent    Interventions: cervidil placed    Assessment/Plan:  Adán Acosta is a 32 y.o. female  at 37w11d admitted for IUP   - GBS negative, No indication for GBS prophylaxis   - VSS, afebrile    - cEFM/TOCO- Cat 1, intermittent contractions   - IVF LR @ 125 ml/hr    IOL  BPP   - Cervidil placed   - Continue to monitor    Attending updated and in agreement with plan    Elizabeth Taylor DO  Ob/Gyn Resident  2023, 6:25 PM

## 2023-02-07 NOTE — FLOWSHEET NOTE
Pt received to L&D per ambulatory; sent from Dr. Felipe Barlow office for induction of labor d/t BPP 6/8. Pt states started leaking fluid 1 hour ago. Placed in room 705. Up to BR et gowned.

## 2023-02-07 NOTE — H&P
OBSTETRICAL HISTORY [facility]    Date: 2023       Time: 2:58 PM   Patient Name: Harmony Combs     Patient : 1995  Room/Bed: [facility]0705[facility]    Admission Date/Time: 2023  2:49 PM      CC: Induction of Labor  BPP (off for tone)     HPI: Harmony Combs is a 32 y.o.  at 37w11d who presents for Induction of Labor  BPP (off for tone) and leakage of fluid. Patient reports she felt a gush of fluid at 1400 and denies constant trickling since. The patient reports fetal movement is present, denies contractions, denies loss of fluid, denies vaginal bleeding. Patient denies any headache, visual changes, difficulty breathing, RUQ pain, N/V, F/C, and pain/swelling in lower extremities. Denies any dysuria or vaginal discharge. DATING:  LMP: Patient's last menstrual period was 2022 (approximate).   Estimated Date of Delivery: 23   Based on: early ultrasound, at 8 6/7 weeks GA    PREGNANCY RISK FACTORS:  Patient Active Problem List   Diagnosis    Acne vulgaris    Acne    Astigmatism    Heart murmur, systolic    Normal pregnancy, antepartum    History of migraine headaches    Rh+/RI/GBSneg       REVIEW OF SYSTEMS:   Constitutional: negative fever, negative chills  HEENT: negative visual disturbances, negative headaches, negative dizziness  Breast: negative breast abnormalities, negative breast lumps, negative nipple discharge  Respiratory: negative dyspnea, negative cough, negative SOB  Cardiovascular: negative chest pain,  negative palpitations, negative arrhythmia, negative syncope   Gastrointestinal: negative abdominal pain, negative RUQ pain, negative N/V/D, negative constipation, negative bowel changes, negative heartburn   Genitourinary: negative pelvic pain, negative dysuria, negative hematuria, negative urinary incontinence, positive vaginal discharge  Dermatological: negative rash, negative pruritis  Hematologic: negative bruising  Immunologic/Lymphatic: negative recent illness, negative recent sick contact  Musculoskeletal: negative back pain, negative myalgias, negative arthralgias  Neurological:  negative migraines, negative seizures, negative weakness  Behavior/Psych: negative depression, negative anxiety, negative SI, negative HI    OBSTETRICAL HISTORY:   OB History    Para Term  AB Living   1 0 0 0 0 0   SAB IAB Ectopic Molar Multiple Live Births   0 0 0 0 0 0      # Outcome Date GA Lbr Alejandro/2nd Weight Sex Delivery Anes PTL Lv   1 Current                PAST MEDICAL HISTORY:   has no past medical history on file. PAST SURGICAL HISTORY:   has a past surgical history that includes Nordman tooth extraction. ALLERGIES:  is allergic to penicillins and penicillins. MEDICATIONS:  Prior to Admission medications    Medication Sig Start Date End Date Taking? Authorizing Provider   fluconazole (DIFLUCAN) 150 MG tablet Take 1 tablet by mouth every 72 hours as needed (vaginal discharge/itching) 22   Gonzalez Tran,    Prenat w/o W-LY-Epgeorr-FA-DHA (PNV-DHA PO) Take by mouth    Historical Provider, MD       FAMILY HISTORY:  family history includes Heart Disease in her father, paternal grandfather, and paternal grandmother. SOCIAL HISTORY:   reports that she has never smoked. She has never used smokeless tobacco. She reports that she does not currently use alcohol. She reports that she does not use drugs.     VITALS:  Vitals:    23 1505   BP: 131/84   Pulse: (!) 103   Resp: 20   Temp: 98.4 °F (36.9 °C)   TempSrc: Oral         PHYSICAL EXAM:  Fetal Heart Monitor:  Baseline Heart Rate 140, moderate variability, present accelerations, absent decelerations  [place]: contractions, every 6 hours    General appearance:  no apparent distress, alert and cooperative  HEENT: head atraumatic, normocephalic, trachea midline, moist mucous membranes   Neurologic:  oriented, normal speech, no focal findings or movement disorder noted  Lungs:  no increased work of breathing, normal chest wall rise bilaterally  Heart:  regular rate and rhythm   Abdomen:  soft, gravid, non-tender on palpation, no right upper quadrant tenderness, no CVA tenderness bilaterally, uterus non-tender, no signs of abruption and no signs of chorioamnionitis  Extremities:  no calf tenderness bilaterally, non-edematous bilaterally  Musculoskeletal: no gross abnormalities, range of motion appropriate for age   Psychiatric: mood appropriate, normal affect     Pelvic exam: normal external genitalia, vulva, vagina, cervix, uterus and adnexa,   VULVA: normal appearing vulva with no masses, tenderness or lesions, VAGINA: normal appearing vagina with brown-tinged discharge in the posterior vault, no lesions. No pooling or valsalva   CERVIX: normal appearing cervix without lesions. Examination chaperoned by Andreea Gómez    Cervix Check: 1 cm dilated, 50 % effaced, -2 station    LIMITED BEDSIDE US:  Position: Cephalic   Fetal Heart Tones: Present  Fetal Movement: Present  Amniotic Fluid Index/Volume: MVP 2x2cm  Estimated Fetal Weight:  7 lbs 15 oz (@ ESTEPHANIE today, 23)    PRENATAL LAB RESULTS:   Blood Type/Rh: A pos  Antibody Screen: negative  Hemoglobin, Hematocrit, Platelets: 80.8 / 37.5 / 338  Rubella: immune  T.  Pallidum, IgG: non-reactive   Hep C: non-reactive   Hepatitis B Surface Antigen: non-reactive   HIV: non-reactive   Gonorrhea: negative  Chlamydia: negative  Urine culture: negative, date: 22    1 hour Glucose Tolerance Test: 70    Group B Strep: negative  Cystic Fibrosis Screen: negative  Sickle Cell Screen: negative  First Trimester Screen: low risk  Non-Invasive Prenatal Testing: low risk for aneuploidy  Anatomy US: Anterior Fundal placenta, 3 vessel cord, male gender, normal anatomy     ASSESSMENT & PLAN:  Bernardo Thompson is a 32 y.o. female  at 37w11d IOL 2/2 BPP     - GBS negative / Rh positive / R immune   - No indication for GBS prophylaxis   - VSS, afebrile   - ATSO Dr. Lucie Burgess   - CBC, TPall, T&S   - UDS R/B/A discussed, consent obtained and in chart   - Cat 1 FHT, TOCO q 6 min   - SVE: /-2 (out of 3 station)   - BSUS: cephalic   - Mode of induction: cytotec 25 mcg PO x1     Hx Systolic Heart Murmur    - Diagnosed in    - Patient had 1 year f/u with cardiology in  and no medications, further assessment, or treatment   - Currently stable on no medications   - No murmur appreciated on auscultation on arrival     Hx Migraines    - Currently managed without medications    - Denies HA at this time     BMI 27    Patient Active Problem List    Diagnosis Date Noted    Rh+/RI/GBSneg 2023     Priority: Medium    Normal pregnancy, antepartum 2022     Priority: Medium    History of migraine headaches 2022     Priority: Medium    Astigmatism 10/04/2017     Priority: Medium    Acne vulgaris 2016     Priority: Medium    Acne 2014     Priority: Medium    Heart murmur, systolic      Priority: Medium       Plan discussed with on-call physician Dr. Virgilio Mclaughlin, who is agreeable. Risks, benefits, alternatives and possible complications have been discussed in detail with the patient. Admission, and post admission procedures and expectations were discussed in detail. All questions were answered.     Patient's primary ob/gyn provider: Dr. Cast President     Steroids Given In This Pregnancy:  no  Steroids given this admission: Олег Rodarte MD  Ob/Gyn Resident  2023, 2:58 PM

## 2023-02-08 ENCOUNTER — ANESTHESIA EVENT (OUTPATIENT)
Dept: LABOR AND DELIVERY | Age: 28
End: 2023-02-08
Payer: COMMERCIAL

## 2023-02-08 ENCOUNTER — ANESTHESIA (OUTPATIENT)
Dept: LABOR AND DELIVERY | Age: 28
End: 2023-02-08
Payer: COMMERCIAL

## 2023-02-08 LAB
AMPHETAMINE SCREEN URINE: NEGATIVE
BARBITURATE SCREEN URINE: NEGATIVE
BENZODIAZEPINE SCREEN, URINE: NEGATIVE
CANNABINOID SCREEN URINE: NEGATIVE
COCAINE METABOLITE, URINE: NEGATIVE
FENTANYL URINE: NEGATIVE
METHADONE SCREEN, URINE: NEGATIVE
OPIATES, URINE: NEGATIVE
OXYCODONE SCREEN URINE: NEGATIVE
PHENCYCLIDINE, URINE: NEGATIVE
PLATELET # BLD AUTO: 320 K/UL (ref 138–453)
TEST INFORMATION: NORMAL

## 2023-02-08 PROCEDURE — 3700000025 EPIDURAL BLOCK: Performed by: ANESTHESIOLOGY

## 2023-02-08 PROCEDURE — 2500000003 HC RX 250 WO HCPCS: Performed by: NURSE ANESTHETIST, CERTIFIED REGISTERED

## 2023-02-08 PROCEDURE — 6360000002 HC RX W HCPCS

## 2023-02-08 PROCEDURE — 1220000000 HC SEMI PRIVATE OB R&B

## 2023-02-08 PROCEDURE — 85049 AUTOMATED PLATELET COUNT: CPT

## 2023-02-08 PROCEDURE — 6360000002 HC RX W HCPCS: Performed by: ANESTHESIOLOGY

## 2023-02-08 PROCEDURE — 6360000002 HC RX W HCPCS: Performed by: NURSE ANESTHETIST, CERTIFIED REGISTERED

## 2023-02-08 PROCEDURE — 2580000003 HC RX 258

## 2023-02-08 PROCEDURE — 36415 COLL VENOUS BLD VENIPUNCTURE: CPT

## 2023-02-08 RX ORDER — MORPHINE SULFATE 10 MG/ML
10 INJECTION, SOLUTION INTRAMUSCULAR; INTRAVENOUS ONCE
Status: COMPLETED | OUTPATIENT
Start: 2023-02-08 | End: 2023-02-08

## 2023-02-08 RX ORDER — ROPIVACAINE HYDROCHLORIDE 2 MG/ML
INJECTION, SOLUTION EPIDURAL; INFILTRATION; PERINEURAL CONTINUOUS PRN
Status: DISCONTINUED | OUTPATIENT
Start: 2023-02-08 | End: 2023-02-09 | Stop reason: SDUPTHER

## 2023-02-08 RX ORDER — NALOXONE HYDROCHLORIDE 0.4 MG/ML
INJECTION, SOLUTION INTRAMUSCULAR; INTRAVENOUS; SUBCUTANEOUS PRN
Status: DISCONTINUED | OUTPATIENT
Start: 2023-02-08 | End: 2023-02-09

## 2023-02-08 RX ORDER — ROPIVACAINE HYDROCHLORIDE 2 MG/ML
INJECTION, SOLUTION EPIDURAL; INFILTRATION; PERINEURAL PRN
Status: DISCONTINUED | OUTPATIENT
Start: 2023-02-08 | End: 2023-02-09 | Stop reason: SDUPTHER

## 2023-02-08 RX ORDER — LIDOCAINE HYDROCHLORIDE 20 MG/ML
INJECTION, SOLUTION EPIDURAL; INFILTRATION; INTRACAUDAL; PERINEURAL PRN
Status: DISCONTINUED | OUTPATIENT
Start: 2023-02-08 | End: 2023-02-09 | Stop reason: SDUPTHER

## 2023-02-08 RX ORDER — LIDOCAINE HYDROCHLORIDE AND EPINEPHRINE 15; 5 MG/ML; UG/ML
INJECTION, SOLUTION EPIDURAL PRN
Status: DISCONTINUED | OUTPATIENT
Start: 2023-02-08 | End: 2023-02-09 | Stop reason: SDUPTHER

## 2023-02-08 RX ORDER — ROPIVACAINE HYDROCHLORIDE 2 MG/ML
INJECTION, SOLUTION EPIDURAL; INFILTRATION; PERINEURAL
Status: COMPLETED
Start: 2023-02-08 | End: 2023-02-08

## 2023-02-08 RX ORDER — LIDOCAINE HYDROCHLORIDE 20 MG/ML
INJECTION, SOLUTION EPIDURAL; INFILTRATION; INTRACAUDAL; PERINEURAL PRN
Status: DISCONTINUED | OUTPATIENT
Start: 2023-02-08 | End: 2023-02-08

## 2023-02-08 RX ORDER — PROCHLORPERAZINE EDISYLATE 5 MG/ML
10 INJECTION INTRAMUSCULAR; INTRAVENOUS ONCE
Status: COMPLETED | OUTPATIENT
Start: 2023-02-08 | End: 2023-02-08

## 2023-02-08 RX ADMIN — LIDOCAINE HYDROCHLORIDE 2 ML: 20 INJECTION, SOLUTION EPIDURAL; INFILTRATION; INTRACAUDAL; PERINEURAL at 23:55

## 2023-02-08 RX ADMIN — Medication 5 ML: at 14:39

## 2023-02-08 RX ADMIN — MORPHINE SULFATE 10 MG: 10 INJECTION INTRAVENOUS at 08:11

## 2023-02-08 RX ADMIN — ROPIVACAINE HYDROCHLORIDE 10 ML/HR: 2 INJECTION, SOLUTION EPIDURAL; INFILTRATION at 14:45

## 2023-02-08 RX ADMIN — PROCHLORPERAZINE EDISYLATE 10 MG: 5 INJECTION INTRAMUSCULAR; INTRAVENOUS at 08:11

## 2023-02-08 RX ADMIN — LIDOCAINE HYDROCHLORIDE,EPINEPHRINE BITARTRATE 3 ML: 15; .005 INJECTION, SOLUTION EPIDURAL; INFILTRATION; INTRACAUDAL; PERINEURAL at 14:37

## 2023-02-08 RX ADMIN — ONDANSETRON 4 MG: 2 INJECTION INTRAMUSCULAR; INTRAVENOUS at 21:48

## 2023-02-08 RX ADMIN — ROPIVACAINE HYDROCHLORIDE 10 ML/HR: 2 INJECTION, SOLUTION EPIDURAL; INFILTRATION; PERINEURAL at 14:45

## 2023-02-08 RX ADMIN — SODIUM CHLORIDE, POTASSIUM CHLORIDE, SODIUM LACTATE AND CALCIUM CHLORIDE: 600; 310; 30; 20 INJECTION, SOLUTION INTRAVENOUS at 15:18

## 2023-02-08 RX ADMIN — MORPHINE SULFATE 10 MG: 10 INJECTION INTRAVENOUS at 09:47

## 2023-02-08 RX ADMIN — SODIUM CHLORIDE, POTASSIUM CHLORIDE, SODIUM LACTATE AND CALCIUM CHLORIDE: 600; 310; 30; 20 INJECTION, SOLUTION INTRAVENOUS at 06:10

## 2023-02-08 RX ADMIN — LIDOCAINE HYDROCHLORIDE 2 ML: 20 INJECTION, SOLUTION EPIDURAL; INFILTRATION; INTRACAUDAL; PERINEURAL at 23:52

## 2023-02-08 RX ADMIN — LIDOCAINE HYDROCHLORIDE 3 ML: 20 INJECTION, SOLUTION EPIDURAL; INFILTRATION; INTRACAUDAL; PERINEURAL at 23:47

## 2023-02-08 RX ADMIN — ROPIVACAINE HYDROCHLORIDE 10 ML/HR: 2 INJECTION, SOLUTION EPIDURAL; INFILTRATION at 20:50

## 2023-02-08 RX ADMIN — Medication 1 MILLI-UNITS/MIN: at 07:55

## 2023-02-08 RX ADMIN — Medication 5 ML: at 14:42

## 2023-02-08 RX ADMIN — LIDOCAINE HYDROCHLORIDE 3 ML: 20 INJECTION, SOLUTION EPIDURAL; INFILTRATION; INTRACAUDAL; PERINEURAL at 23:44

## 2023-02-08 ASSESSMENT — PAIN DESCRIPTION - LOCATION
LOCATION: ABDOMEN;BACK
LOCATION: ABDOMEN;BACK

## 2023-02-08 ASSESSMENT — PAIN SCALES - GENERAL
PAINLEVEL_OUTOF10: 6
PAINLEVEL_OUTOF10: 7

## 2023-02-08 ASSESSMENT — PAIN DESCRIPTION - DESCRIPTORS: DESCRIPTORS: CRAMPING

## 2023-02-08 NOTE — FLOWSHEET NOTE
*** Anesthesia notified of request for epidural.  *** -  Dr. Cuate Montanez at bedside and consent form signed. Everyone in room is wearing a mask. Risks and benefits discussed and patient verbalizes understanding. Patient verbalizes to proceed with procedure. Time out performed. *** - to dangle position. ***  - procedure started. ***  - test dose given. Patient tolerated procedure well. *** -to low fowlers position with hip wedge in place. ***  -first dose given. *** - epidural pump started. See anesthesia note.

## 2023-02-08 NOTE — CARE COORDINATION
ANTEPARTUM NOTE    39 weeks gestation of pregnancy [Z3A.39]    Carmen Knight was admitted to L&D on 2/7/23 for IOL for 6/8 BPP @ 39w6d    OB GYN Provider: Dr. Leila Eisenberg [facility]    Will meet with patient after delivery to verify name/address/phone/insurance and discuss discharge planning. Anticipate DC home 2 nights after vaginal delivery or 4 nights after C/S delivery as long as hemodynamically stable.

## 2023-02-08 NOTE — PROGRESS NOTES
Labor Progress Note    Anuj Chung is a 32 y.o. female  at 37w0d  The patient was seen and examined. Her pain is well controlled with epidural. She reports fetal movement is present, complains of contractions, denies loss of fluid, denies vaginal bleeding.        Vital Signs:  Vitals:    23 1530 23 1600 23 1630 23 1700   BP: 112/66  106/64 90/69   Pulse: 63  65 78   Resp: 16  16 16   Temp:       TempSrc:       SpO2: 100% 99% 99% 98%   Weight:       Height:           FHT: 150, moderate variability, accelerations present, decelerations absent  Contractions: regular, every 2-3 minutes    Chaperone for Intimate Exam: Chaperone was present for entire exam, Chaperone Name: Wander Rosas RN  Cervical Exam: 6 cm dilated, 80 effaced, 0 station  Pitocin: @ 6 mu/min    Membranes: Ruptured clear fluid  Scalp Electrode in place: absent  Intrauterine Pressure Catheter in Place: present    Interventions: SVE, IUPC placement    Assessment/Plan:  Anuj Chung is a 32 y.o. female  at 37w0d admitted for IOL  BPP   - GBS negative, No indication for GBS prophylaxis  - VSS, Afebrile  - cEFM/TOCO  - S/p Cervidil   - s/p altamirano balloon  - Epidural in place  - Altamirano in place   - Pitocin per protocol   - IUPC in place  - AROM clear fluid  - Anticipate vaginal delivery  - Continue to monitor            Attending updated and in agreement with plan    Ann Smith DO  Ob/Gyn Resident  2023, 5:27 PM

## 2023-02-08 NOTE — PROGRESS NOTES
Labor Progress Note    Elissa Andrew is a 32 y.o. female  at 37w0d  The patient was seen and examined. Writer to bedside  to altamirano balloon spontaneously removed. Her pain is well controlled. She reports fetal movement is present, complains of contractions, denies loss of fluid, denies vaginal bleeding.        Vital Signs:  Vitals:    23 1100 23 1130 23 1200 23 1207   BP:  97/67     Pulse:  95     Resp:  16     Temp:       TempSrc:       SpO2: 98% 96% 96% 96%   Weight:       Height:           FHT:  145 , moderate variability, accelerations present, decelerations absent  Contractions: regular, every 4-5 minutes    Chaperone for Intimate Exam: Chaperone was present for entire exam, Chaperone Name: Trenton Dutta RN   Cervical Exam: 5 cm dilated, 50 effaced, -1 station  Pitocin: @ 8 mu/min    Membranes: Intact  Scalp Electrode in place: absent  Intrauterine Pressure Catheter in Place: absent    Interventions: SVE    Assessment/Plan:  Elissa Andrew is a 32 y.o. female  at 37w0d admitted for IOL 2/2 6/10 BPP    - GBS negative, No indication for GBS prophylaxis   - VSS, afebrile   - cEFM/TOCO: cat 1 tracing    - S/p Cervidil    - S/p altamirano balloon    - Epidural ordered    - Pitocin per protocol    - Will plan for AROM following epidural placement    - Continue current care     Attending updated and in agreement with plan    Emmanuel Ferris MD  Ob/Gyn Resident  2023, 1:16 PM

## 2023-02-08 NOTE — ANESTHESIA PRE PROCEDURE
Department of Anesthesiology  Preprocedure Note       Name:  Tena Mota   Age:  32 y.o.  :  1995                                          MRN:  [id number]         Date:  2023      Surgeon: Nilsa Labor    Procedure: Labor Epidural    Medications prior to admission:   Prior to Admission medications    Medication Sig Start Date End Date Taking? Authorizing Provider   fluconazole (DIFLUCAN) 150 MG tablet Take 1 tablet by mouth every 72 hours as needed (vaginal discharge/itching)  Patient not taking: Reported on 22   Bao Tran DO   Prenat w/o J-MM-Zovovzu-FA-DHA (PNV-DHA PO) Take by mouth    Historical Provider, MD       Current medications:    Current Facility-Administered Medications   Medication Dose Route Frequency Provider Last Rate Last Admin    oxytocin (PITOCIN) 30 units in 500 mL infusion  1-20 terence-units/min IntraVENous Continuous Thai Hoyos MD 8 mL/hr at 23 1130 8 terence-units/min at 23 1130    lactated ringers IV soln infusion   IntraVENous Continuous Joanna Maharaj  mL/hr at 23 0610 New Bag at 23 0610    lactated ringers bolus  500 mL IntraVENous PRN Joanna Maharaj MD        Or    lactated ringers bolus  1,000 mL IntraVENous PRN Joanna Maharaj MD        sodium chloride flush 0.9 % injection 5-40 mL  5-40 mL IntraVENous 2 times per day Joanna Maharaj MD        sodium chloride flush 0.9 % injection 5-40 mL  5-40 mL IntraVENous PRN Joanna Maharaj MD        0.9 % sodium chloride infusion  25 mL IntraVENous PRN Joanna Maharaj MD        ondansetron [facility]) injection 4 mg  4 mg IntraVENous Q6H PRN Joanna Maharaj MD        diphenhydrAMINE (BENADRYL) tablet 25 mg  25 mg Oral Q4H PRN Joanna Maharaj MD        acetaminophen (TYLENOL) tablet 1,000 mg  1,000 mg Oral Q6H PRN Joanna Maharaj MD           Allergies:     Allergies   Allergen Reactions    Penicillins     Penicillins Rash     Other reaction(s): Rash-Severe  As a child  As a child         Problem List:    Patient Active Problem List   Diagnosis Code    Acne L70.9    Astigmatism H52.209    Heart Murmur R01.1    Hx Migraines Z86.69    44 Weeks Gestation Z3A.39       Past Medical History:  History reviewed. No pertinent past medical history. Past Surgical History:        Procedure Laterality Date    WISDOM TOOTH EXTRACTION         Social History:    Social History     Tobacco Use    Smoking status: Never    Smokeless tobacco: Never   Substance Use Topics    Alcohol use: Not Currently     Comment: not currently due to pregnancy                                Counseling given: Not Answered      Vital Signs (Current):   Vitals:    02/08/23 1100 02/08/23 1130 02/08/23 1200 02/08/23 1207   BP:  97/67     Pulse:  95     Resp:  16     Temp:       TempSrc:       SpO2: 98% 96% 96% 96%   Weight:       Height:                                                  BP Readings from Last 3 Encounters:   02/08/23 97/67   02/07/23 120/70   02/01/23 116/64       NPO Status:  1300 2/8/2023                                                                               BMI:   Wt Readings from Last 3 Encounters:   02/07/23 185 lb (83.9 kg)   02/07/23 185 lb (83.9 kg)   02/01/23 186 lb (84.4 kg)     Body mass index is 28.13 kg/m².     CBC:   Lab Results   Component Value Date/Time    WBC 12.5 02/07/2023 05:30 PM    RBC 4.83 02/07/2023 05:30 PM    HGB 14.6 02/07/2023 05:30 PM    HCT 43.3 02/07/2023 05:30 PM    MCV 89.6 02/07/2023 05:30 PM    RDW 13.5 02/07/2023 05:30 PM    PLT See Reflexed IPF Result 02/07/2023 05:30 PM       CMP:   Lab Results   Component Value Date/Time     06/30/2022 07:37 AM    K 4.4 06/30/2022 07:37 AM     06/30/2022 07:37 AM    CO2 23 06/30/2022 07:37 AM    BUN 7 06/30/2022 07:37 AM    CREATININE 0.59 06/30/2022 07:37 AM    GFRAA >60 06/30/2022 07:37 AM    LABGLOM >60 06/30/2022 07:37 AM    GLUCOSE 70 11/07/2022 08:55 AM    GLUCOSE 78 06/30/2022 07:37 AM PROT 6.9 2022 07:37 AM    CALCIUM 9.3 2022 07:37 AM    BILITOT 0.67 2022 07:37 AM    ALKPHOS 48 2022 07:37 AM    AST 19 2022 07:37 AM    ALT 18 2022 07:37 AM       POC Tests: No results for input(s): POCGLU, POCNA, POCK, POCCL, POCBUN, POCHEMO, POCHCT in the last 72 hours. Coags: No results found for: PROTIME, INR, APTT    HCG (If Applicable):   Lab Results   Component Value Date    HCGQUANT 103,926 (H) 2022        ABGs: No results found for: PHART, PO2ART, CLO4TRJ, KPH0ONV, BEART, Q7LFKPPJ     Type & Screen (If Applicable):  No results found for: LABABO, LABRH    Drug/Infectious Status (If Applicable):  Lab Results   Component Value Date/Time    HEPCAB NONREACTIVE 2022 01:34 PM       COVID-19 Screening (If Applicable): No results found for: COVID19        Anesthesia Evaluation  Patient summary reviewed and Nursing notes reviewed no history of anesthetic complications:   Airway: Mallampati: II  TM distance: >3 FB   Neck ROM: full  Mouth opening: > = 3 FB   Dental: normal exam         Pulmonary:normal exam                               Cardiovascular:  Exercise tolerance: good (>4 METS),   (+) valvular problems/murmurs (Hx of systolic murmur, nothing at present):,                   Neuro/Psych:   (+) headaches: migraine headaches,             GI/Hepatic/Renal:   (+) GERD:,           Endo/Other: Negative Endo/Other ROS                    Abdominal:   (+) obese,     Abdomen: soft. Vascular: negative vascular ROS. Other Findings: , GA 40 weeks, induction for decreased BPP. Anesthesia Plan      epidural     ASA 2             Anesthetic plan and risks discussed with patient. Plan discussed with attending.           Post-op pain plan if not by surgeon: continuous epidural            Andreina Cabrales, APRN - CRNA   2023

## 2023-02-08 NOTE — PROGRESS NOTES
Labor Progress Note    Deborah Damon is a 32 y.o. female  at 37w0d  The patient was seen and examined. Writer to bedside / to completion of epidural placement. AROM (clear) performed at bedside without complication, patient tolerated procedure well. Her pain is well controlled. She reports fetal movement is present, complains of contractions, denies loss of fluid, denies vaginal bleeding.        Vital Signs:  Vitals:    23 1207 23 1330 23 1400 23 1430   BP:  123/78 126/81 (!) 115/95   Pulse:  90 100 (!) 103   Resp:  16 16 16   Temp:  98.2 °F (36.8 °C)  98 °F (36.7 °C)   TempSrc:  Oral  Oral   SpO2: 96% 98% 95% 99%   Weight:       Height:           FHT:  140 , moderate variability, accelerations present, decelerations absent  Contractions: regular, every 3 minutes    Chaperone for Intimate Exam: Chaperone was present for entire exam, Chaperone Name: Neil Zeng RN   Cervical Exam: 5 cm dilated, 50 effaced, -1 station  Pitocin: @ 8 mu/min    Membranes: Ruptured, clear  Scalp Electrode in place: absent  Intrauterine Pressure Catheter in Place: absent    Interventions: SVE, AROM    Assessment/Plan:  Deborah Damon is a 32 y.o. female  at 37w0d admitted for IOL 2/2 6/10 BPP    - GBS negative, No indication for GBS prophylaxis   - VSS, afebrile   - cEFM/TOCO: cat 1 tracing    - S/p Cervidil    - S/p altamirano balloon    - Epidural in place   - AROM (clear) @ 1515   - Pitocin per protocol    - Continue current care     Attending updated and in agreement with plan    Serenity Miller MD  Ob/Gyn Resident  2023, 3:19 PM

## 2023-02-08 NOTE — PROGRESS NOTES
Labor Progress Note    Anuj Chung is a 32 y.o. female  at 37w0d  The patient was seen and examined. Her pain is well controlled. She reports fetal movement is present, complains of contractions, denies loss of fluid, denies vaginal bleeding.        Vital Signs:  Vitals:    23 1505 23 1507 23 1825 23 2213   BP: 131/84  134/73 (!) 123/57   Pulse: (!) 103  80 66   Resp: 20  20 17   Temp: 98.4 °F (36.9 °C)  99.1 °F (37.3 °C) 98.9 °F (37.2 °C)   TempSrc: Oral  Oral Oral   Weight:  185 lb (83.9 kg)     Height:  5' 8\" (1.727 m)           FHT: 130, moderate variability, accelerations present, decelerations absent  Contractions: regular, every 6-9 minutes    Chaperone for Intimate Exam: Chaperone was present for entire exam, Chaperone Name: MI Navarro  Cervical Exam: 1 cm dilated, 50 effaced, -1 station  Pitocin: @ 0 mu/min    Membranes: Intact  Scalp Electrode in place: absent  Intrauterine Pressure Catheter in Place: absent    Interventions: SVE, Gibson balloon placement     Assessment/Plan:  Anuj Chung is a 32 y.o. female  at 37w0d admitted for IOL 2/2 6/10 BPP (tone)   - GBS negative, No indication for GBS prophylaxis   - VSS, afebrile   - CEFM/TOCO showing cat 1 tracing   - S/p Cervidil   - Gibson balloon placed    - LDP ordered   - Continue to monitor    Attending updated and in agreement with plan    Italia Moyer MD  Ob/Gyn Resident  2023, 5:59 AM

## 2023-02-08 NOTE — ANESTHESIA PROCEDURE NOTES
Epidural Block    Patient location during procedure: OB  Start time: 2/8/2023 2:31 PM  End time: 2/8/2023 2:41 PM  Reason for block: labor epidural  Staffing  Performed: resident/CRNA   Resident/CRNA: LEWIS Curtis CRNA  Epidural  Patient position: sitting  Prep: Betadine  Patient monitoring: continuous pulse ox and frequent blood pressure checks  Approach: midline  Location: L3-4  Injection technique: ROXIE air  Guidance: paresthesia technique  Provider prep: mask and sterile gloves  Needle  Needle type: Tuohy   Needle gauge: 17 G  Needle length: 3.5 in  Needle insertion depth: 5 cm  Catheter type: multi-orifice  Catheter size: 19 G  Catheter at skin depth: 11 cm  Test dose: negativeCatheter Secured: tegaderm and tape  Assessment  Sensory level: T6  Hemodynamics: stable  Attempts: 1  Outcomes: uncomplicated and patient tolerated procedure well  Preanesthetic Checklist  Completed: patient identified, IV checked, risks and benefits discussed, equipment checked, pre-op evaluation, timeout performed, anesthesia consent given, oxygen available and monitors applied/VS acknowledged

## 2023-02-08 NOTE — PROGRESS NOTES
Labor Progress Note    Flcao Mcgrath is a 32 y.o. female  at 37w0d  The patient was seen and examined. Her pain is well controlled. She reports fetal movement is present, complains of contractions, denies loss of fluid, denies vaginal bleeding.        Vital Signs:  Vitals:    23 1630 23 1700 23 1730 23 1800   BP: 106/64 90/69  104/66   Pulse: 65 78  99   Resp: 16 16  16   Temp:       TempSrc:       SpO2: 99% 98% 98% 99%   Weight:       Height:             FHT:  145 , moderate variability, accelerations present, decelerations absent  Contractions: regular, every 3 minutes    Chaperone for Intimate Exam: Chaperone was present for entire exam, Chaperone Name: MI Santiago  Cervical Exam: 5 cm dilated, 80 effaced, 0 station  Pitocin: @ 10 mu/min    Membranes: Ruptured clear fluid  Scalp Electrode in place: absent  Intrauterine Pressure Catheter in Place: Present    Interventions: SVE    Assessment/Plan:  Flaco Mcgrath is a 32 y.o. female  at 37w0d admitted for IUP   - GBS negative, No indication for GBS prophylaxis   - VSS, afebrile    - CEFM/TOCO showing cat 1 tracing   - S/p altamirano balloon   - S/p Cervidil   - S/p morphine x2, compazine x1   - Epidural in place   - AROM (clr) @ 1515   - IUPC in place   - Pitocin per protocol   - Continue to monitor      Attending updated and in agreement with plan    Rosie Medellin MD  Ob/Gyn Resident  2023, 6:22 PM

## 2023-02-08 NOTE — FLOWSHEET NOTE
RN at bedside pt resting comfortably in bed. Pt states she is going to sleep - does not need any medication at this time. Will continue to monitor.

## 2023-02-08 NOTE — FLOWSHEET NOTE
14:30pm Anesthesia notified of request for epidural.  14:32pm -  SAE Ramirez at bedside and consent form signed. Everyone in room is wearing a mask. Risks and benefits discussed and patient verbalizes understanding. Patient verbalizes to proceed with procedure. Time out performed. 14:33pm - to dangle position. 14:34pm  - procedure started. 14:37pm  - test dose given. Patient tolerated procedure well. 14:42pm -to low fowlers position with hip wedge in place. 14:41pm  -first dose given. 14:45pm - epidural pump started. See anesthesia note.

## 2023-02-09 PROBLEM — Z3A.39 39 WEEKS GESTATION OF PREGNANCY: Status: RESOLVED | Noted: 2023-02-07 | Resolved: 2023-02-09

## 2023-02-09 PROBLEM — O13.9 GESTATIONAL HYPERTENSION: Status: ACTIVE | Noted: 2023-02-09

## 2023-02-09 LAB
ABSOLUTE EOS #: 0 K/UL (ref 0–0.44)
ABSOLUTE IMMATURE GRANULOCYTE: 0.27 K/UL (ref 0–0.3)
ABSOLUTE LYMPH #: 1.33 K/UL (ref 1.1–3.7)
ABSOLUTE MONO #: 1.86 K/UL (ref 0.1–1.2)
ALBUMIN SERPL-MCNC: 3.1 G/DL (ref 3.5–5.2)
ALBUMIN/GLOBULIN RATIO: 1.1 (ref 1–2.5)
ALP SERPL-CCNC: 152 U/L (ref 35–104)
ALT SERPL-CCNC: 12 U/L (ref 5–33)
ANION GAP SERPL CALCULATED.3IONS-SCNC: 15 MMOL/L (ref 9–17)
AST SERPL-CCNC: 35 U/L
BASOPHILS # BLD: 0 % (ref 0–2)
BASOPHILS ABSOLUTE: 0 K/UL (ref 0–0.2)
BILIRUB SERPL-MCNC: 0.8 MG/DL (ref 0.3–1.2)
BUN SERPL-MCNC: 8 MG/DL (ref 6–20)
CALCIUM SERPL-MCNC: 9.2 MG/DL (ref 8.6–10.4)
CHLORIDE SERPL-SCNC: 103 MMOL/L (ref 98–107)
CO2 SERPL-SCNC: 17 MMOL/L (ref 20–31)
CREAT SERPL-MCNC: 0.62 MG/DL (ref 0.5–0.9)
EOSINOPHILS RELATIVE PERCENT: 0 % (ref 1–4)
GFR SERPL CREATININE-BSD FRML MDRD: >60 ML/MIN/1.73M2
GLUCOSE SERPL-MCNC: 117 MG/DL (ref 70–99)
HCT VFR BLD AUTO: 39 % (ref 36.3–47.1)
HGB BLD-MCNC: 13.1 G/DL (ref 11.9–15.1)
IMMATURE GRANULOCYTES: 1 %
LYMPHOCYTES # BLD: 5 % (ref 24–43)
MCH RBC QN AUTO: 31 PG (ref 25.2–33.5)
MCHC RBC AUTO-ENTMCNC: 33.6 G/DL (ref 28.4–34.8)
MCV RBC AUTO: 92.2 FL (ref 82.6–102.9)
MONOCYTES # BLD: 7 % (ref 3–12)
MORPHOLOGY: NORMAL
NRBC AUTOMATED: 0 PER 100 WBC
PDW BLD-RTO: 13.7 % (ref 11.8–14.4)
PLATELET # BLD AUTO: 310 K/UL (ref 138–453)
PMV BLD AUTO: 10.3 FL (ref 8.1–13.5)
POTASSIUM SERPL-SCNC: 3.9 MMOL/L (ref 3.7–5.3)
PROT SERPL-MCNC: 6 G/DL (ref 6.4–8.3)
RBC # BLD: 4.23 M/UL (ref 3.95–5.11)
SEG NEUTROPHILS: 87 % (ref 36–65)
SEGMENTED NEUTROPHILS ABSOLUTE COUNT: 23.04 K/UL (ref 1.5–8.1)
SODIUM SERPL-SCNC: 135 MMOL/L (ref 135–144)
WBC # BLD AUTO: 26.5 K/UL (ref 3.5–11.3)

## 2023-02-09 PROCEDURE — 59400 OBSTETRICAL CARE: CPT | Performed by: OBSTETRICS & GYNECOLOGY

## 2023-02-09 PROCEDURE — 82570 ASSAY OF URINE CREATININE: CPT

## 2023-02-09 PROCEDURE — 84156 ASSAY OF PROTEIN URINE: CPT

## 2023-02-09 PROCEDURE — 85025 COMPLETE CBC W/AUTO DIFF WBC: CPT

## 2023-02-09 PROCEDURE — 3E033VJ INTRODUCTION OF OTHER HORMONE INTO PERIPHERAL VEIN, PERCUTANEOUS APPROACH: ICD-10-PCS | Performed by: OBSTETRICS & GYNECOLOGY

## 2023-02-09 PROCEDURE — 6370000000 HC RX 637 (ALT 250 FOR IP): Performed by: STUDENT IN AN ORGANIZED HEALTH CARE EDUCATION/TRAINING PROGRAM

## 2023-02-09 PROCEDURE — 6370000000 HC RX 637 (ALT 250 FOR IP)

## 2023-02-09 PROCEDURE — 80053 COMPREHEN METABOLIC PANEL: CPT

## 2023-02-09 PROCEDURE — 36415 COLL VENOUS BLD VENIPUNCTURE: CPT

## 2023-02-09 PROCEDURE — 10H07YZ INSERTION OF OTHER DEVICE INTO PRODUCTS OF CONCEPTION, VIA NATURAL OR ARTIFICIAL OPENING: ICD-10-PCS | Performed by: OBSTETRICS & GYNECOLOGY

## 2023-02-09 PROCEDURE — 1220000000 HC SEMI PRIVATE OB R&B

## 2023-02-09 PROCEDURE — 51701 INSERT BLADDER CATHETER: CPT

## 2023-02-09 PROCEDURE — 10907ZC DRAINAGE OF AMNIOTIC FLUID, THERAPEUTIC FROM PRODUCTS OF CONCEPTION, VIA NATURAL OR ARTIFICIAL OPENING: ICD-10-PCS | Performed by: OBSTETRICS & GYNECOLOGY

## 2023-02-09 PROCEDURE — 7200000001 HC VAGINAL DELIVERY

## 2023-02-09 PROCEDURE — 3E0P7VZ INTRODUCTION OF HORMONE INTO FEMALE REPRODUCTIVE, VIA NATURAL OR ARTIFICIAL OPENING: ICD-10-PCS | Performed by: OBSTETRICS & GYNECOLOGY

## 2023-02-09 PROCEDURE — 2580000003 HC RX 258: Performed by: STUDENT IN AN ORGANIZED HEALTH CARE EDUCATION/TRAINING PROGRAM

## 2023-02-09 PROCEDURE — 6360000002 HC RX W HCPCS

## 2023-02-09 PROCEDURE — 6360000002 HC RX W HCPCS: Performed by: ANESTHESIOLOGY

## 2023-02-09 RX ORDER — ONDANSETRON 2 MG/ML
4 INJECTION INTRAMUSCULAR; INTRAVENOUS EVERY 4 HOURS PRN
Status: DISCONTINUED | OUTPATIENT
Start: 2023-02-09 | End: 2023-02-10 | Stop reason: HOSPADM

## 2023-02-09 RX ORDER — HYDROCORTISONE 25 MG/G
CREAM TOPICAL
Status: DISCONTINUED | OUTPATIENT
Start: 2023-02-09 | End: 2023-02-10 | Stop reason: HOSPADM

## 2023-02-09 RX ORDER — LANOLIN 72 %
OINTMENT (GRAM) TOPICAL PRN
Status: DISCONTINUED | OUTPATIENT
Start: 2023-02-09 | End: 2023-02-10 | Stop reason: HOSPADM

## 2023-02-09 RX ORDER — SODIUM CHLORIDE 9 MG/ML
INJECTION, SOLUTION INTRAVENOUS PRN
Status: DISCONTINUED | OUTPATIENT
Start: 2023-02-09 | End: 2023-02-10 | Stop reason: HOSPADM

## 2023-02-09 RX ORDER — BISACODYL 10 MG
10 SUPPOSITORY, RECTAL RECTAL DAILY PRN
Status: DISCONTINUED | OUTPATIENT
Start: 2023-02-09 | End: 2023-02-10 | Stop reason: HOSPADM

## 2023-02-09 RX ORDER — DOCUSATE SODIUM 100 MG/1
100 CAPSULE, LIQUID FILLED ORAL 2 TIMES DAILY
Status: DISCONTINUED | OUTPATIENT
Start: 2023-02-09 | End: 2023-02-10 | Stop reason: HOSPADM

## 2023-02-09 RX ORDER — SIMETHICONE 80 MG
80 TABLET,CHEWABLE ORAL EVERY 6 HOURS PRN
Status: DISCONTINUED | OUTPATIENT
Start: 2023-02-09 | End: 2023-02-10 | Stop reason: HOSPADM

## 2023-02-09 RX ORDER — IBUPROFEN 600 MG/1
600 TABLET ORAL EVERY 6 HOURS
Status: DISCONTINUED | OUTPATIENT
Start: 2023-02-09 | End: 2023-02-10 | Stop reason: HOSPADM

## 2023-02-09 RX ORDER — SODIUM CHLORIDE 0.9 % (FLUSH) 0.9 %
5-40 SYRINGE (ML) INJECTION EVERY 12 HOURS SCHEDULED
Status: DISCONTINUED | OUTPATIENT
Start: 2023-02-09 | End: 2023-02-10 | Stop reason: HOSPADM

## 2023-02-09 RX ORDER — SODIUM CHLORIDE 0.9 % (FLUSH) 0.9 %
5-40 SYRINGE (ML) INJECTION PRN
Status: DISCONTINUED | OUTPATIENT
Start: 2023-02-09 | End: 2023-02-10 | Stop reason: HOSPADM

## 2023-02-09 RX ORDER — IBUPROFEN 600 MG/1
600 TABLET ORAL EVERY 6 HOURS PRN
Qty: 60 TABLET | Refills: 1 | Status: SHIPPED | OUTPATIENT
Start: 2023-02-09

## 2023-02-09 RX ORDER — EPHEDRINE SULFATE 50 MG/ML
10 INJECTION INTRAVENOUS EVERY 5 MIN PRN
Status: DISCONTINUED | OUTPATIENT
Start: 2023-02-09 | End: 2023-02-09

## 2023-02-09 RX ORDER — ACETAMINOPHEN 500 MG
1000 TABLET ORAL EVERY 8 HOURS SCHEDULED
Status: DISCONTINUED | OUTPATIENT
Start: 2023-02-09 | End: 2023-02-10 | Stop reason: HOSPADM

## 2023-02-09 RX ORDER — ACETAMINOPHEN 500 MG
1000 TABLET ORAL EVERY 8 HOURS PRN
Status: DISCONTINUED | OUTPATIENT
Start: 2023-02-09 | End: 2023-02-09

## 2023-02-09 RX ORDER — ACETAMINOPHEN 500 MG
1000 TABLET ORAL EVERY 6 HOURS PRN
Qty: 120 TABLET | Refills: 1 | Status: SHIPPED | OUTPATIENT
Start: 2023-02-09

## 2023-02-09 RX ORDER — SENNA AND DOCUSATE SODIUM 50; 8.6 MG/1; MG/1
1 TABLET, FILM COATED ORAL DAILY
Qty: 30 TABLET | Refills: 1 | Status: SHIPPED | OUTPATIENT
Start: 2023-02-09

## 2023-02-09 RX ADMIN — IBUPROFEN 600 MG: 600 TABLET, FILM COATED ORAL at 06:23

## 2023-02-09 RX ADMIN — ACETAMINOPHEN 1000 MG: 500 TABLET ORAL at 23:18

## 2023-02-09 RX ADMIN — IBUPROFEN 600 MG: 600 TABLET, FILM COATED ORAL at 14:00

## 2023-02-09 RX ADMIN — Medication 166.7 ML: at 03:39

## 2023-02-09 RX ADMIN — DOCUSATE SODIUM 100 MG: 100 CAPSULE ORAL at 20:54

## 2023-02-09 RX ADMIN — ROPIVACAINE HYDROCHLORIDE 12 ML/HR: 2 INJECTION, SOLUTION EPIDURAL; INFILTRATION at 02:30

## 2023-02-09 RX ADMIN — SODIUM CHLORIDE, PRESERVATIVE FREE 10 ML: 5 INJECTION INTRAVENOUS at 20:55

## 2023-02-09 RX ADMIN — ACETAMINOPHEN 1000 MG: 500 TABLET ORAL at 15:33

## 2023-02-09 RX ADMIN — IBUPROFEN 600 MG: 600 TABLET, FILM COATED ORAL at 20:55

## 2023-02-09 ASSESSMENT — PAIN SCALES - GENERAL
PAINLEVEL_OUTOF10: 4
PAINLEVEL_OUTOF10: 3
PAINLEVEL_OUTOF10: 4

## 2023-02-09 ASSESSMENT — PAIN DESCRIPTION - LOCATION
LOCATION: PERINEUM
LOCATION: PERINEUM

## 2023-02-09 ASSESSMENT — PAIN DESCRIPTION - ORIENTATION: ORIENTATION: MID;LOWER

## 2023-02-09 ASSESSMENT — PAIN DESCRIPTION - DESCRIPTORS: DESCRIPTORS: SORE

## 2023-02-09 NOTE — L&D DELIVERY NOTE
Mother's Information      Labor Events     Labor?: No  Cervical Ripening:   Now               AwaisCachorroTamikaelisabetnigel, Baby Boy Veronique Massed [[id number]]      Labor Events     Labor?: No   Steroids?: None  Cervical Ripening Date/Time:     Cervical Ripening Type: Cervidil, Gibson/EASI  Antibiotics Received during Labor?: No  Rupture Date/Time: 23 15:07:00   Rupture Type: AROM  Fluid Color: Clear  Fluid Odor: None  Fluid Volume: Moderate  Induction: Gibson Bulb (Balloon), Oxytocin, Cervidil       Anesthesia    Method: Epidural       Start Pushing      Labor onset date/time:   Now     Dilation complete date/time: 23 02:38:00 EST Now     Start pushing date/time: 2023 02:40:00   Decision date/time (emergent ):           Delivery ()      Delivery Date/Time:  23 04:29:00   Delivery Type: Vaginal, Spontaneous    Details:            [place] Presentation    Presentation: Vertex       Shoulder Dystocia    Shoulder Dystocia Present?: No  Add Second Maneuver  Add Third Maneuver  Add Fourth Maneuver  Add Fifth Maneuver  Add Sixth Maneuver  Add Seventh Maneuver  Add Eighth Maneuver  Add Ninth Maneuver       Assisted Delivery Details    Forceps Attempted?: No  Vacuum Extractor Attempted?: No       Document Additional Attempt         Document Additional Attempt                 Cord    Vessels: 3 Vessels  Complications: None  Delayed Cord Clamping?: Yes  Cord Clamped Date/Time: 2023 04:30:00  Cord Blood Disposition: Lab  Gases Sent?: Yes       Placenta    Date/Time: 2023 03:39:00  Removal: Spontaneous  Appearance: Intact  Disposition: Discarded       Lacerations    Episiotomy: None  Perineal Lacerations: None  Other Lacerations: no non-perineal laceration  Number of Repair Packets: 0       Vaginal Counts    Initial Count Personnel: DR. Ann Hogna  Initial Count Verified By: CHICO CAMPBELL    Sponges [place] Instruments   Initial Counts Correct  Correct   Final Counts Correct  Correct Final Count Personnel: DR. Rommel Ng  Final Count Verified By: CHICO CAMPBELL  Accurate Final Count?: Yes  If the count is incorrect due to Intentionally Retained Foreign Object (IRFO) add the IRFO LDA in Lines/Drains. Add LDA: Link to [facility]       Blood Loss  Mother: Scarlett Mann #[id number]     Start of Mother's Information      Delivery Blood Loss  23 1629 - 23 0455      None                 End of Mother's Information  Mother: Scarlett Drilling #[id number]                Delivery Providers    Delivering clinician: Cleo Samuels DO     Provider Role    Cleo Samuels DO Obstetrician    Travis Hernandez, RN Primary Nurse    Fortino Damon Primary [place] Nurse     NICU Nurse     Neonatologist     Anesthesiologist     Nurse Anesthetist     Nurse Practitioner     Midwife     Nursery Nurse    Trisha Castaneda MD Resident    Jf Rai DO Resident              [place] Assessment    Living Status: Living  Delivery Location Comment: 705     Apgar Scoring Key:    0 1 2    Skin Color: Blue or pale Acrocyanotic Completely pink    Heart Rate: Absent <100 bpm >100 bpm    Reflex Irritability: No response Grimace Cry or active withdrawal    Muscle Tone: Limp Some flexion Active motion    Respiratory Effort: Absent Weak cry; hypoventilation Good, crying                      Skin Color:   Heart Rate:   Reflex Irritability:   Muscle Tone:   Respiratory Effort: Total:            1 Minute:    0    2    2    2    2    8        Apgar 1 total from OB History    5 Minute:    1    2    2    2    2    9        Apgar 5 total from OB History    10 Minute:              15 Minute:              20 Minute:                        Apgars Assigned By: CHICO CAMPBELL              Resuscitation    Method: Bulb Suction, Room Air, Stimulation             [place] Measurements               Title      Skin to Skin Initiation Date/Time:       Skin to Skin End Date/Time:                  Vaginal Delivery Note  Department of Obstetrics and Gynecology  [facility]       Patient: Judie Trujillo   : 1995  MRN: [id number]   Date of delivery: 23     Pre-operative Diagnosis: Judie Trujillo  at 40w1d  Induction of Labor Secondary to 6/8 BPP  Elevated BP x1  BMI 27    Post-operative Diagnosis:  same as above and  living infant male    Delivering Obstetrician & Assistant(s): Dr. Venecia Higgins DO; Vince Back DO, PGY3; Yady Alegre MD, PGY1    Infant Information:   Information for the patient's :  LauraAng, Carlene Boy Moises Lee [[id number]]      Information for the patient's :  LauraAng, [address] [[id number]]        Apgar scores: 8 at 1 minute and 9 at 5 minutes. Anesthesia:  epidural anesthesia    Application and Delivery:    She was known to be GBS negative and antibiotic prophylaxis was not indicated. The patient progressed well, received an epidural, became complete and felt the urge to push. After pushing with contractions the fetal head delivered Cephalic, right occiput anterior over an intact perineum, nuchal cord was not present. The anterior, then posterior shoulder delivered easily and atraumatically followed by the rest of the infant. Nose and mouth suctioned with bulb suction, infant was stimulated and dried. Cord was clamped and cut after one minute delayed cord clamping and infant was placed on the mother's abdomen, and attended by RN for evaluation. The delivery of the placenta was spontaneous and appeared largely in tact with an accessory lobe that was manually removed. The umbilical cord had three vessels noted. Pitocin was started. The vagina was swept of all clots and debris. The perineum and vagina were evaluated and no lacerations were ntoed. Mother and baby tolerated procedure well. Dr. Ari Barrett was present for entire delivery.     Delivery Summary:  Labor & Delivery Summary  Dilation Complete Date: 23  Dilation Complete Time: 8594    Specimen: cord blood and cord gases  Quantitative blood loss:  75cc immediately following delivery  Condition:  infant stable to general nursery and mother stable  Counts: instrument and sponge counts correct  Blood Type and Rh: A POSITIVE    Rubella Immunity Status: immune  Infant Feeding: breast feeding    Karely Ratliff MD  Ob/Gyn Resident  2/9/2023, 4:55 AM

## 2023-02-09 NOTE — FLOWSHEET NOTE
0615 Pt seen sitting up in bed. Requested to go washroom to pass urine. Assisted pt to bathroom, voided freely, few blood clots noted in the pads and measured. Mary care done. Pt assisted back to bed. Vital signs checked. Called resident to show the blood clots. Dr. Cara Pinto decided to do manual evacuation of small clots, measured and documented in QBL. Total QBL is 395ml. Pt tolerated procedure well.

## 2023-02-09 NOTE — FLOWSHEET NOTE
Pt verbalized feeling more pain towards left inguinal area. Positioned towards left side. Use of PCEA button encouraged. Pt verbalizing she is not feeling any pain relief. Pain Score 8/10 with contraction. CRNA informed and said he will come to see the pt.

## 2023-02-09 NOTE — PROGRESS NOTES
Labor Progress Note    Ajith Coronel is a 32 y.o. female  at 37w0d  The patient was seen and examined. Her pain is well controlled. She reports fetal movement is present, complains of contractions, denies loss of fluid, denies vaginal bleeding.        Vital Signs:  Vitals:    23 0045 23 0100 23 0115 23 0130   BP: 126/65 122/73 119/71 120/61   Pulse: 80 73 79 86   Resp: 16 16 16 16   Temp:       TempSrc:       SpO2:  97%  95%   Weight:       Height:             FHT:  145 , moderate variability, accelerations present, decelerations absent  Contractions: regular, every 2-3 minutes    Chaperone for Intimate Exam: Chaperone was present for entire exam, Chaperone Name: MI Santiago  Cervical Exam: \"rim\" dilated, 100 effaced, +1 station  Pitocin: @ 18 mu/min    Membranes: Ruptured clear fluid  Scalp Electrode in place: absent  Intrauterine Pressure Catheter in Place: Present    Interventions: SVE    Assessment/Plan:  Ajith Coronel is a 32 y.o. female  at 37w0d admitted for IUP   - GBS negative, No indication for GBS prophylaxis   - VSS, afebrile    - CEFM/TOCO showing cat 1 tracing   - S/p altamirano balloon   - S/p Cervidil   - S/p morphine x2, compazine x1   - Epidural in place   - AROM (clr) @ 1515   - IUPC in place   - Pitocin per protocol   - Continue to monitor      Attending updated and in agreement with plan    Lata Braga MD  Ob/Gyn Resident  2023, 2:07 AM

## 2023-02-09 NOTE — FLOWSHEET NOTE
Called Dr. Hannah Padilla and gave update about the pt. Minimal vaginal bleeding noted, no blood clots seen in the pads. Recovery ended and agreed to shift pt to the woods.

## 2023-02-09 NOTE — CARE COORDINATION
CASE MANAGEMENT POST-PARTUM TRANSITIONAL CARE PLAN    39 weeks gestation of pregnancy [Z3A.39]    OB Provider: mmw    Writer met w/ Jann Holley and her  Isiah Lopez at bedside to discuss DCP. She is S/P  on 23 @ 40w1d at 12 of male infant    Writer verified name/address/phone number correct on facesheet. She states she lives with her . Jann Holley verbalized no difficulties with transportation to and from doctors appointments or with paying for medications upon discharge home. [employer] insurance correct. Writer notified Jann Holley and Isiah Lopez they have 30 days from date of birth to add  to insurance policy. They verbalized understanding. Kate Rebollar confirmed a safe place for infant to sleep at home. Infant name on BC: Lawmichelle Denham. Infant PCP Allen Tran.      DME: none  HOME CARE: none    Anticipate DC of couplet 23    Readmission Risk              Risk of Unplanned Readmission:  4

## 2023-02-09 NOTE — FLOWSHEET NOTE
Pt complained of feeling pressure. Pt is pressing PCEA for bolus. Informed Dr. Anders Res. Came and SVE done by him 7/90/0. EFM tracing reviewed with him, fetal tachycardia at 170s bpm, IV hydration and reposition done prior.

## 2023-02-09 NOTE — FLOWSHEET NOTE
SAE Najera came and attended to the pt. Epidural clinician bolus given by him and Epidural basal rate increased by 12 ml/hr. Pt is comfortable. Stable vital signs.

## 2023-02-09 NOTE — CARE COORDINATION
Social Work     Sw reviewed medical record (current active problem list) and tox screens and found no current concerns. Sw spoke with mom and fob Marya Huggins) briefly to explain Sw role, inquire if any needs or concerns, and provide safe sleep education and discuss. Mom provided verbal consent for fob to present during assessment. Mom denied any needs or questions and informs baby has a safe sleep environment. ([place])     Mom denied any current s/s of anxiety or depression and is aware to reach out to OB if any s/s occur after dc. Mom reports a really good support system and denied any current questions or needs. Mom reports this is her 1st child. Mom reports that she resides with fob. Mom states ped will be at [place] in [facility]. Sw did provide mom with [employer] ([employer]) flyer so she is aware of this new free resource in state of [place]. Sw encouraged mom to reach out if any issues or concerns arise.                         Katie Intern Alan Ruffin

## 2023-02-09 NOTE — LACTATION NOTE
Assisted with position and latch in cradle hold on the left breast. Baby latched with some bursts of sucking. He then fell asleep. Reassurance given.

## 2023-02-09 NOTE — ANESTHESIA POSTPROCEDURE EVALUATION
Department of Anesthesiology  Postprocedure Note    Patient: Hawa Contreras  MRN: [id number]  YOB: 1995  Date of evaluation: 2/9/2023      Procedure Summary     Date: 02/08/23 Room / Location:     Anesthesia Start: 1430 Anesthesia Stop: 02/09/23 0429    Procedure: Labor Analgesia Diagnosis:     Scheduled Providers:  Responsible Provider: Mamadou Haas MD    Anesthesia Type: epidural ASA Status: 2          Anesthesia Type: No value filed.     Salbador Phase I: Salbador Score: 10    Salbador Phase II:        Anesthesia Post Evaluation    Patient location during evaluation: PACU  Patient participation: complete - patient participated  Level of consciousness: awake  Airway patency: patent  Nausea & Vomiting: no nausea and no vomiting  Complications: no  Cardiovascular status: blood pressure returned to baseline  Respiratory status: acceptable  Hydration status: euvolemic  Comments: /79   Pulse 98   Temp 99 °F (37.2 °C) (Oral)   Resp 16   Ht 5' 8\" (1.727 m)   Wt 185 lb (83.9 kg)   LMP 04/30/2022 (Approximate)   SpO2 96%   Breastfeeding Unknown   BMI 28.13 kg/m²

## 2023-02-09 NOTE — FLOWSHEET NOTE
Patient admitted to room 746 from 7A via w/c. Oriented to room and surroundings. Plan of care reviewed. Verbalized understanding. Instructed on infant security and safe sleep practices. Preventing falls education provided . The following handouts given: A New Beginning: Your Guide to Postpartum Care, Rounding, gs Security System,Babies Cry A lot, Safe Sleep, Security and Visitation Guidelines. Call light placed within reach.

## 2023-02-09 NOTE — FLOWSHEET NOTE
Pt requested to be checked. SVE done by Dr. Lara Client, rim/100/+1. Blood tinged urine noted in [facility] cath. Dr. Lara Client aware.

## 2023-02-09 NOTE — PROGRESS NOTES
Resident Interval Note    MD called to bedside by RN concerning postpartum bleeding. Patient soaked through 1 pad, with numerous small clots noted on the pad. A bimanual examination was conducted, revealing mild uterine atony on the left side. The uterus was cleaned of all clots and debris, improved uterine tone was noted on the ipsilateral side. There is no continued bleeding after the exam was concluded. RN and patient encouraged to reach out with any concerns for further bleeding. We will continue to monitor. Documentation in this note was recorded with Dragon speech to text software, and reviewed prior to signing.     Aster Ramos MD  Obstetrics & Gynecology Resident, PGY-1  [address] [place], [place]  2/9/2023 6:37 AM

## 2023-02-09 NOTE — LACTATION NOTE
Placed baby in cross cradle hold on the right breast. After repeated attempts he latched and fed well. Taught how to help keep him stimulated.

## 2023-02-09 NOTE — LACTATION NOTE
Refined baby's position on the left breast in cradle hold. Taught gathering and a deep latch. Baby latched and began feeding well. Mom noted that it felt comfortable. Reviewed feeding pattern expectations. Packet of breastfeeding information given. Encouraged mom to call out for assistance as needed.

## 2023-02-09 NOTE — FLOWSHEET NOTE
Pitocin order confirmed with Dr. Uday Vanessa and not doing low dose Pitocin order as previously documented. He will update the order of Pitocin. Pitocin currently at 14ml/hr.

## 2023-02-10 VITALS
BODY MASS INDEX: 28.04 KG/M2 | DIASTOLIC BLOOD PRESSURE: 76 MMHG | WEIGHT: 185 LBS | HEIGHT: 68 IN | HEART RATE: 76 BPM | OXYGEN SATURATION: 99 % | SYSTOLIC BLOOD PRESSURE: 116 MMHG | RESPIRATION RATE: 16 BRPM | TEMPERATURE: 97.5 F

## 2023-02-10 LAB
ABSOLUTE EOS #: 0.32 K/UL (ref 0–0.44)
ABSOLUTE IMMATURE GRANULOCYTE: 0.13 K/UL (ref 0–0.3)
ABSOLUTE LYMPH #: 1.89 K/UL (ref 1.1–3.7)
ABSOLUTE MONO #: 0.75 K/UL (ref 0.1–1.2)
BASOPHILS # BLD: 1 % (ref 0–2)
BASOPHILS ABSOLUTE: 0.08 K/UL (ref 0–0.2)
CREATININE URINE: 113.3 MG/DL (ref 28–217)
EOSINOPHILS RELATIVE PERCENT: 2 % (ref 1–4)
HCT VFR BLD AUTO: 32.6 % (ref 36.3–47.1)
HGB BLD-MCNC: 10.8 G/DL (ref 11.9–15.1)
IMMATURE GRANULOCYTES: 1 %
LYMPHOCYTES # BLD: 12 % (ref 24–43)
MCH RBC QN AUTO: 30.5 PG (ref 25.2–33.5)
MCHC RBC AUTO-ENTMCNC: 33.1 G/DL (ref 28.4–34.8)
MCV RBC AUTO: 92.1 FL (ref 82.6–102.9)
MONOCYTES # BLD: 5 % (ref 3–12)
NRBC AUTOMATED: 0 PER 100 WBC
PDW BLD-RTO: 13.9 % (ref 11.8–14.4)
PLATELET # BLD AUTO: 262 K/UL (ref 138–453)
PMV BLD AUTO: 9.9 FL (ref 8.1–13.5)
RBC # BLD: 3.54 M/UL (ref 3.95–5.11)
SEG NEUTROPHILS: 80 % (ref 36–65)
SEGMENTED NEUTROPHILS ABSOLUTE COUNT: 12.73 K/UL (ref 1.5–8.1)
TOTAL PROTEIN, URINE: 17 MG/DL
URINE TOTAL PROTEIN CREATININE RATIO: 0.15 (ref 0–0.2)
WBC # BLD AUTO: 15.9 K/UL (ref 3.5–11.3)

## 2023-02-10 PROCEDURE — 2580000003 HC RX 258: Performed by: STUDENT IN AN ORGANIZED HEALTH CARE EDUCATION/TRAINING PROGRAM

## 2023-02-10 PROCEDURE — 36415 COLL VENOUS BLD VENIPUNCTURE: CPT

## 2023-02-10 PROCEDURE — 85025 COMPLETE CBC W/AUTO DIFF WBC: CPT

## 2023-02-10 PROCEDURE — 6370000000 HC RX 637 (ALT 250 FOR IP)

## 2023-02-10 PROCEDURE — 99024 POSTOP FOLLOW-UP VISIT: CPT | Performed by: OBSTETRICS & GYNECOLOGY

## 2023-02-10 PROCEDURE — 6370000000 HC RX 637 (ALT 250 FOR IP): Performed by: STUDENT IN AN ORGANIZED HEALTH CARE EDUCATION/TRAINING PROGRAM

## 2023-02-10 RX ADMIN — DOCUSATE SODIUM 100 MG: 100 CAPSULE ORAL at 09:54

## 2023-02-10 RX ADMIN — IBUPROFEN 600 MG: 600 TABLET, FILM COATED ORAL at 04:00

## 2023-02-10 RX ADMIN — ACETAMINOPHEN 1000 MG: 500 TABLET ORAL at 07:05

## 2023-02-10 RX ADMIN — IBUPROFEN 600 MG: 600 TABLET, FILM COATED ORAL at 09:54

## 2023-02-10 RX ADMIN — SODIUM CHLORIDE, PRESERVATIVE FREE 10 ML: 5 INJECTION INTRAVENOUS at 09:54

## 2023-02-10 ASSESSMENT — PAIN DESCRIPTION - LOCATION
LOCATION: PERINEUM
LOCATION: PERINEUM

## 2023-02-10 ASSESSMENT — PAIN SCALES - GENERAL
PAINLEVEL_OUTOF10: 2
PAINLEVEL_OUTOF10: 3

## 2023-02-10 ASSESSMENT — PAIN DESCRIPTION - DESCRIPTORS
DESCRIPTORS: SORE
DESCRIPTORS: ACHING;SORE

## 2023-02-10 ASSESSMENT — PAIN - FUNCTIONAL ASSESSMENT: PAIN_FUNCTIONAL_ASSESSMENT: ACTIVITIES ARE NOT PREVENTED

## 2023-02-10 NOTE — LACTATION NOTE
Assisted with latching baby on left side in cross cradle. Baby sleepy at first but was able to latch and maintain a good sucking rhythm. Audible swallows noted, reviewed deep latch and to correct if shallow. Encouraged to reach out with any questions or concerns.

## 2023-02-10 NOTE — PROGRESS NOTES
Obstetrical Rounds:    PPD#: [id number] [facility] Day: 2  Procedure: normal spontaneous vaginal delivery    Date: 2/10/2023  Time: 8:22 AM        Patient Name: Lincoln Howell  Patient : 1995  Room/Bed: 4791/3699-49  Admission Date/Time: 2023  2:49 PM  MRN #: [id number]  CSN #: [id number]        Attending Physician Statement  I have discussed the care of Lulú Sargent Armand, including pertinent history and exam findings,  with the resident. I have reviewed their note in the electronic medical record. I have seen and examined the patient and the key elements of all parts of the encounter have been performed/reviewed by me . I agree with the assessment, plan and orders as documented by the resident. Pt seen & examined. Pt without c/c. Pt requesting discharge home. Leukocytosis pt asymptomatic most likely de margination.  Will recheck prior to discharge    Vitals:    23 2315   BP: 114/67   Pulse: 68   Resp: 16   Temp: 98.2 °F (36.8 °C)   SpO2:        Admission on 2023   Component Date Value Ref Range Status    WBC 2023 12.5 (A)  3.5 - 11.3 k/uL Final    RBC 2023 4.83  3.95 - 5.11 m/uL Final    Hemoglobin 2023 14.6  11.9 - 15.1 g/dL Final    Hematocrit 2023 43.3  36.3 - 47.1 % Final    MCV 2023 89.6  82.6 - 102.9 fL Final    MCH 2023 30.2  25.2 - 33.5 pg Final    MCHC 2023 33.7  28.4 - 34.8 g/dL Final    RDW 2023 13.5  11.8 - 14.4 % Final    Platelets  See Reflexed IPF Result  138 - 453 k/uL Final    NRBC Automated 2023 0.0  0.0 per 100 WBC Final    Seg Neutrophils 2023 81 (A)  36 - 65 % Final    Lymphocytes 2023 12 (A)  24 - 43 % Final    Monocytes 2023 6  3 - 12 % Final    Eosinophils % 2023 0 (A)  1 - 4 % Final    Basophils 2023 0  0 - 2 % Final    Immature Granulocytes 2023 2 (A)  0 % Final    Segs Absolute 2023 10.08 (A)  1.50 - 8.10 k/uL Final    Absolute Lymph # 2023 1.45 1.10 - 3.70 k/uL Final    Absolute Mono # 02/07/2023 0.69  0.10 - 1.20 k/uL Final    Absolute Eos # 02/07/2023 0.03  0.00 - 0.44 k/uL Final    Basophils Absolute 02/07/2023 0.05  0.00 - 0.20 k/uL Final    Absolute Immature Granulocyte 02/07/2023 0.19  0.00 - 0.30 k/uL Final    T. pallidum, IgG 02/07/2023 NONREACTIVE  NONREACTIVE Final    Comment:       T. pallidum antibodies are not detected. There is no serological evidence of infection with T. pallidum (early primary syphilis   cannot be excluded). Retest in 2-4 weeks if syphilis is clinically suspect.             Expiration Date 02/07/2023 02/10/2023,2359   Final    Arm Band Number 02/07/2023 BE [id number]   Final    ABO/Rh 02/07/2023 A POSITIVE   Final    Antibody Screen 02/07/2023 NEGATIVE   Final    Amphetamine Screen, Ur 02/07/2023 NEGATIVE  NEGATIVE Final    Comment:       (Positive cutoff 1000 ng/mL)                  Barbiturate Screen, Ur 02/07/2023 NEGATIVE  NEGATIVE Final    Comment:       (Positive cutoff 200 ng/mL)                  Benzodiazepine Screen, Urine 02/07/2023 NEGATIVE  NEGATIVE Final    Comment:       (Positive cutoff 200 ng/mL)                  Cocaine Metabolite, Urine 02/07/2023 NEGATIVE  NEGATIVE Final    Comment:       (Positive cutoff 300 ng/mL)                  Methadone Screen, Urine 02/07/2023 NEGATIVE  NEGATIVE Final    Comment:       (Positive cutoff 300 ng/mL)                  Opiates, Urine 02/07/2023 NEGATIVE  NEGATIVE Final    Comment:       (Positive cutoff 300 ng/mL)                  Phencyclidine, Urine 02/07/2023 NEGATIVE  NEGATIVE Final    Comment:       (Positive cutoff 25 ng/mL)                  Cannabinoid Scrn, Ur 02/07/2023 NEGATIVE  NEGATIVE Final    Comment:       (Positive cutoff 50 ng/mL)                  Oxycodone Screen, Ur 02/07/2023 NEGATIVE  NEGATIVE Final    Comment:       (Positive cutoff 100 ng/mL)                  Fentanyl, Ur 02/07/2023 NEGATIVE  NEGATIVE Final    Comment:       (Positive cutoff  5 ng/ml)            Test Information 02/07/2023 Assay provides medical screening only. The absence of expected drug(s) and/or metabolite(s) may indicate diluted or adulterated urine, limitations of testing or timing of collection. Final    Comment: Testing for legal purposes should be confirmed by another method. To request confirmation   of test result, please call the lab within 7 days of sample submission. Platelet, Fluorescence 02/07/2023 Platelet clumps present, count appears adequate.   138 - 453 k/uL Final    ORDERED BY LAB    Platelets 20/51/7752 320  138 - 453 k/uL Final    WBC 02/09/2023 26.5 (A)  3.5 - 11.3 k/uL Final    RBC 02/09/2023 4.23  3.95 - 5.11 m/uL Final    Hemoglobin 02/09/2023 13.1  11.9 - 15.1 g/dL Final    Hematocrit 02/09/2023 39.0  36.3 - 47.1 % Final    MCV 02/09/2023 92.2  82.6 - 102.9 fL Final    MCH 02/09/2023 31.0  25.2 - 33.5 pg Final    MCHC 02/09/2023 33.6  28.4 - 34.8 g/dL Final    RDW 02/09/2023 13.7  11.8 - 14.4 % Final    Platelets 38/31/0125 310  138 - 453 k/uL Final    MPV 02/09/2023 10.3  8.1 - 13.5 fL Final    NRBC Automated 02/09/2023 0.0  0.0 per 100 WBC Final    Immature Granulocytes 02/09/2023 1 (A)  0 % Final    Seg Neutrophils 02/09/2023 87 (A)  36 - 65 % Final    Lymphocytes 02/09/2023 5 (A)  24 - 43 % Final    Monocytes 02/09/2023 7  3 - 12 % Final    Eosinophils % 02/09/2023 0 (A)  1 - 4 % Final    Basophils 02/09/2023 0  0 - 2 % Final    Absolute Immature Granulocyte 02/09/2023 0.27  0.00 - 0.30 k/uL Final    Segs Absolute 02/09/2023 23.04 (A)  1.50 - 8.10 k/uL Final    Absolute Lymph # 02/09/2023 1.33  1.10 - 3.70 k/uL Final    Absolute Mono # 02/09/2023 1.86 (A)  0.10 - 1.20 k/uL Final    Absolute Eos # 02/09/2023 0.00  0.00 - 0.44 k/uL Final    Basophils Absolute 02/09/2023 0.00  0.00 - 0.20 k/uL Final    Morphology 02/09/2023 Normal   Final    Glucose 02/09/2023 117 (A)  70 - 99 mg/dL Final    BUN 02/09/2023 8  6 - 20 mg/dL Final    Creatinine 02/09/2023 0. 62  0.50 - 0.90 mg/dL Final    Est, Glom Filt Rate 02/09/2023 >60  >60 mL/min/1.73m2 Final    Comment:       These results are not intended for use in patients <25years of age. eGFR results are calculated without a race factor using the 2021 CKD-EPI equation. Careful clinical correlation is recommended, particularly when comparing to results   calculated using previous equations. The CKD-EPI equation is less accurate in patients with extremes of muscle mass, extra-renal   metabolism of creatine, excessive creatine ingestion, or following therapy that affects   renal tubular secretion. Calcium 02/09/2023 9.2  8.6 - 10.4 mg/dL Final    Sodium 02/09/2023 135  135 - 144 mmol/L Final    Potassium 02/09/2023 3.9  3.7 - 5.3 mmol/L Final    Chloride 02/09/2023 103  98 - 107 mmol/L Final    CO2 02/09/2023 17 (A)  20 - 31 mmol/L Final    Anion Gap 02/09/2023 15  9 - 17 mmol/L Final    Alkaline Phosphatase 02/09/2023 152 (A)  35 - 104 U/L Final    ALT 02/09/2023 12  5 - 33 U/L Final    AST 02/09/2023 35 (A)  <32 U/L Final    Total Bilirubin 02/09/2023 0.8  0.3 - 1.2 mg/dL Final    Total Protein 02/09/2023 6.0 (A)  6.4 - 8.3 g/dL Final    Albumin 02/09/2023 3.1 (A)  3.5 - 5.2 g/dL Final    Albumin/Globulin Ratio 02/09/2023 1.1  1.0 - 2.5 Final         Discharge Instructions for Labor and Delivery, Vaginal Birth     A vaginal birth refers to the baby being delivered through the vagina. The amount of time that labor can take varies greatly. Labor for the average first-born baby is about 12 hours. Usually your hospital stay after a routine delivery is no more than two nights. Some new mothers go home the same day. Recovery from childbirth varies depending upon whether you had an episiotomy (an incision in the perineum, the area between your vaginal opening and your anus), the duration of labor and delivery, and the amount of rest you get.    In general, it takes about 6-8 weeks for a woman's body to recover from childbirth. What You Will Need   Along with your medications, you will need the following:   Sanitary pads    Nursing pads    Witch hazel pads    Sitz bath    Steps to [address] will want to arrange for transportation home for you and your baby. The baby will need a car seat. You will receive instructions in the hospital for breastfeeding and taking care of the perineum area. You may use ointment for cracked nipples or warm water rinses to your perineum. You will need to wear sanitary pads for about six weeks after delivery. If you had an episiotomy or vaginal tear, you will be sent home with a plastic squirt bottle. Fill it with warm water and squirt over the vaginal and anal area every time you urinate and defecate. Warm baths can be soothing to healing tissues. Apply warm or cold cloths to sore breasts. Apply ointment to cracked nipples. Use nursing pads for leaky breast.    Apply witch hazel pads to sore perineum (area between vagina and anus). Ask your doctor about when it is safe for you to shower or bathe. Sit in a sitz bath to soothe sore perineum and/or hemorrhoids. A sitz bath is soaking the hip and buttocks area in warm water. You can buy a plastic sitz bath at most [employer]. It will fit on your toilet. You can also use your bathtub. Diet    Eat a well balanced, healthy diet to help your recover from childbirth. If you are breastfeeding, you will need additional calories each day. You may also be advised to avoid certain foods. Some women experience constipation after childbirth. To avoid this problem:   Drink plenty of fluids. Eat foods high in fiber, such as:   Whole grain cereals and breads   Fruits and vegetables   Legumes (eg, beans, lentils)   Physical Activity    Labor and delivery is tiring. Rest when you can to regain your energy. Your doctor will encourage you to exercise by walking.  Ask your doctor when you will be able to return to more strenuous exercise. Your doctor may suggest you do Kegel exercises to strengthen your pelvic muscles. To do these tighten your muscles as if you were stopping your urine flow. Hold for a few seconds and then relax. Do these throughout the day. Medications    Breastfeeding can cause your uterus to contract. If painful, your doctor may recommend a pain reliever. If you are breastfeeding, it's important to ask your doctor about taking medications. You may receive from the hospital a list of medications to avoid. Once your doctor has approved your medications, it's important to: Take your medication as directed. Do not change the amount or the schedule. Do not stop taking them without talking to your doctor. Do not share them. Know what the results and side effects may be. Report bothersome side effects to your doctor. Some drugs can be dangerous when mixed. Talk to a doctor or pharmacist if you are taking more than one drug. This includes over-the-counter medication and herb or dietary supplements. Plan ahead for refills so you don't run out. Lifestyle Changes    Having a baby requires significant lifestyle changes. You and your doctor will plan lifestyle changes that will help you recover. Some things to keep in mind include: It is important to get enough rest so you can recover. Try sleeping when the baby sleeps. Ask your doctor when you can resume sexual relations. If you have not done so already, talk to your doctor about birth control options. If you are breastfeeding, consider a breast pump. Call your obstetrician and/or pediatrician for any questions that arise.    Understand the changes your body is going through as it recovers from childbirth:   Hot and cold flashes as your body adjusts to new hormone and blood flow levels   Urinary or fecal incontinence due to stretched muscles   After pains from uterine contractions as the uterus shrinks   Vaginal bleeding (called lochia), which is heavier than your period (generally stops within two months)   Baby blues, feelings of irritability, sadness, crying, or anxiety. Postpartum depression is more severe, occurring in 10%-20% of new moms. If you experience such symptoms, contact your doctor. Consider joining a support group for new mothers. You can get encouragement and learn parenting strategies. Follow-up   Schedule a follow-up appointment as directed by your doctor. Call Your Doctor If Any of the Following Occurs   It is important for you to monitor your recovery once you leave the hospital. That way, you can alert your doctor to any problems immediately. If any of the following occurs, call your doctor:   Signs of infection, including fever and chills    Increased bleeding: soaking more than one sanitary pad an hour    Wounds that become red, swollen or drain pus    Vaginal discharge that smells foul    New pain, swelling, or tenderness in your legs    Pain that you can't control with the medications you've been given    Pain, burning, urgency or frequency of urination, or persistent bleeding in the urine    Cough, shortness of breath, or chest pain    Depression, suicidal thoughts, or feelings of harming your baby    Breasts that are hot, red and accompanied by fever    Any cracking or bleeding from the nipple or areola (the dark-colored area of the breast)      In case of an emergency, call 911 immediately. Home care, Restrictions,  Follow up Care, and birth control review completed    RTO 2-3 weeks    Secondary Smoke risks and Sudden Infant Death Syndrome were reviewed with recommendations. Cessation options discussed. Signs and Symptoms of Post Partum Depression were reviewed. The patient is to call if any occur. Signs and symptoms of Mastitis were reviewed. The patient is to call if any occur for follow up.       Attending's Name:  Electronically signed by Jeremías Posey DO on 2/10/2023 at 8:22 AM

## 2023-02-10 NOTE — PROGRESS NOTES
CLINICAL PHARMACY NOTE: MEDS TO BEDS    Total # of Prescriptions Filled: 3   The following medications were delivered to the patient:  Ibuprofen  Senna  acetaminophen    Additional Documentation:   $10.56 collected via [employer]

## 2023-02-10 NOTE — DISCHARGE INSTRUCTIONS
Follow-up with your OB doctor in 2 weeks or as specified by your physician. Please refer to A NEW BEGINNING- YOUR PERSONAL GUIDE TO POSTPARTUM CARE book provided in your room. Please take this book home with you to refer to. For Breastfeeding moms, you can contact our lactation specialist,  with any problems or questions you may have. Phone number [phone]. Feel free to leave voice mail and your call will be returned as soon as possible. DIET  Eat a well balanced diet focusing on foods high in fiber and protein. Drink 8-10 glasses of fluids daily, especially water. To avoid constipation you may take a mild stool softener as recommended by your doctor or midwife. ACTIVITY  Gradually increase your activity. Resume exercise regimen only after advise by your doctor or midwife. Avoid lifting anything heavier than your baby or a gallon of milk for SIX weeks. Avoid driving 1 week for vaginal delivery and 2 weeks for  section, unless otherwise instructed by physician. Rise slowly from a lying to sitting and then a standing position. Climb stairs carefully. Use caution when carrying your baby up and down the stairs. NO SEXUAL Activity for 6 weeks or until advised by your doctor; Nothing in vagina: intercourse, tampons, or douching. Be prepared to discuss family planning at your follow-up OB visit. You may feel tired or have a lack of energy. You may continue your prenatal vitamin to replenish nutrients post delivery. Nap when baby naps to catch up on sleep. May shower, NO tub baths, swimming, or hot tubs. EMOTIONS  You may feel porter, sad, teary, & overwhelmed for the first 2 weeks postpartum. Contact your OB provider if you feel you may be showing signs of postpartum depression, or have thoughts of harming yourself or your infant. If infant will not stop crying, contact another adult for help or place infant in their crib on their back and take a break.   NEVER shake your infant. BLEEDING  Vaginal bleeding will decrease in amount over the next few weeks. You will notice that as your activity increases, your flow may increase. This is your body's way of telling you, you need take things easier and rest more often. Call your OB/ER if you are saturating one maxi pad in an hour & passing large clots. BREAST CARE  Take medications as recommended by your doctor or midwife for pain  If you develop a warm, red, tender area on your breast or develop a fever contact your lactation consultant. [phone]. For breastfeeding moms:  If you become engorged, feeding may be more difficult or painful for 1-2 days. You may find it helpful to hand express some milk so that the infant can latch on more easily. While breastfeeding, continue to take your prenatal vitamins as directed by your doctor or midwife. Refer to the breastfeeding booklet in the  folder/binder for more information. For any questions or concerns contact a Lactation Consultant. Leave a message and your call will be returned. MJ CARE  Shower daily, perineum with mild soap. Use the mj-bottle until bleeding stops each time you use the restroom. Cleanse your perineum from front to back. If used, stitches will dissolve in 4-6 weeks. You may use a sitz bath or soak in a clean tub with drain open and water running for comfort. Kegel exercises will help restore bladder control. SWELLING  Try to keep your legs elevated when you are sitting. When lying down keep your legs elevated. CALL THE DOCTOR WITH THESE HEALTH CONCERNS OR PROBLEMS  If you have a temp of 100.4or more. If your bleeding has increased and you are saturating a pad in an hour. Your abdomen is tender to touch. You are passing blood clots bigger than the size of a lemon. If you are experiencing extreme weakness or dizziness. If you are having flu-like symptoms such as achy muscles or joints.   There is a foul smell or a green color to your vaginal bleeding. If you have pain that cannot be relieved. You have persistent burning with urination or frequency. Call if you have concerns about your well-being. You are unable to sleep, eat, or are having thoughts of harming yourself or your baby. You have a red, warm, tender area in you calf.          Please call 911 or go to the nearest Emergency department in the event of:  Pulmonary embolism-a blood clot in your lungs which could cause  Shortness of breath at rest  Chest pain that worsens when coughing  Change in level of consciousness  Cardiac Disease which could cause  Shortness of breath or difficulty breathing  Heart palpitations  Chest pain or pressure  Hypertension which could cause  Severe constant headache that does not respond to over-the-counter medications, rest or hydration  Pre-eclampsia which could cause  Severe constant headache that does not respond to over-the-counter medications, rest or hydration  Pain in upper right abdomen  Changes in vision, seeing spots, flashes of light  Swelling in face/hands/legs more than you would expect  Changes in level of consciousness  Eclampsia which could cause  Seizures  Obstetric hemorrhage which could cause  Bleeding through more than 1 pad/hour  Passing clots larger than a golf ball  Venous thrombosis-a blood clot usually in the leg (calf) which could cause  Leg pain, tenderness to the touch, burning, redness-particularly in the calf area  Swelling in one leg more than the other  Infection which could cause  Temperature more than 100.4F  Bad smelling blood or discharge from the vagina  Increase in redness or discharge from  site  Postpartum Depression which could cause  Feeling out of control, unable to care for yourself or the baby  Feeling sad or depressed most of the day   Having trouble sleeping or sleeping to much  Having trouble bonding with your baby

## 2023-02-10 NOTE — LACTATION NOTE
Assisted pt with latch and positioning. Reviewed deep latch technique and how to get a deep latch every time. Reviewed how to take baby off breast comfortably if she does need to relatch. Reviewed how to know if baby is getting enough and signs of milk transfer with pt. Encouraged feeding on demand and waking to feed at least every 3 hours until baby passes birth weight. Encouraged follow up with lactation as needed. Pt verbalized understanding.

## 2023-02-10 NOTE — PROGRESS NOTES
POST PARTUM DAY # 1    Spencer Anaya is a 32 y.o. female  This patient was seen & examined today.  on 23    Her pregnancy was complicated by:   Patient Active Problem List   Diagnosis    Acne    Astigmatism    Heart Murmur    Hx Migraines     23 M Apg  Wt 8#3    gHTN (G1)     (spontaneous vaginal delivery)       Today she is doing well without any chief complaint. Her lochia is light. She denies chest pain, shortness of breath, headache, lightheadedness and blurred vision. She is breast feeding and she denies any breast tenderness. She is ambulating well. Her voiding pattern is normal. I reviewed signs and symptoms of post partum depression with the patient, she currently denies any of these symptoms. She is tolerating solids.      Vital Signs:  Vitals:    23 0645 23 0720 23 2032 23 2315   BP: 118/65 116/72 122/72 114/67   Pulse: 84 83 67 68   Resp: 17 18 16 16   Temp:  98.2 °F (36.8 °C) 98.1 °F (36.7 °C) 98.2 °F (36.8 °C)   TempSrc:  Oral Oral Oral   SpO2:  96% 99%    Weight:       Height:             Physical Exam:  General:  no apparent distress, alert and cooperative  Neurologic:  alert, oriented, normal speech, no focal findings or movement disorder noted  Lungs:  No increased work of breathing, good air exchange, no cyanosis  Heart:  regular rate  Abdomen: abdomen soft, non-distended, appropriately tender  Fundus: appropriately tender, firm, below umbilicus  Extremities:  no calf tenderness, non edematous      Lab:  Lab Results   Component Value Date    HGB 13.1 2023     Lab Results   Component Value Date    HCT 39.0 2023       Assessment/Plan:  Spencer Anaya is a  PPD # 1 s/p    - Doing well, VSS   - Male infant in [address], circumcision desired   - Encourage ambulation   - Postpartum Hgb/Hct completed, Hgb 13.1  Rh positive/Rubella immune   - Rhogam not indicated  Breast feeding   - Counseled on s/sx of mastitis  Gestational hypertension   -Pressures currently normotensive   -Denies signs or symptoms of preeclampsia   -Preeclampsia labs within normal limits, P/C ratio pending  Continue post partum care  BMI 27    Counseling Completed:  Secondary Smoke risks and Sudden Infant Death Syndrome were reviewed with recommendations. Infant sleeping, \"back to sleep\" and avoidance of co-sleeping recommendations were reviewed. Signs and Symptoms of Post Partum Depression were reviewed. The patient is to call if any occur. Signs and symptoms of Mastitis were reviewed. The patient is to call if any occur for follow up.   Discharge instructions including pelvic rest, no driving with pain medicine and office follow-up were reviewed with patient     Attending Physician: Dr. Hiral Olson MD  Ob/Gyn Resident   2/9/2023, 11:57 PM

## 2023-03-22 ENCOUNTER — TELEPHONE (OUTPATIENT)
Dept: OBGYN CLINIC | Age: 28
End: 2023-03-22

## 2023-03-27 ENCOUNTER — TELEPHONE (OUTPATIENT)
Dept: OBGYN CLINIC | Age: 28
End: 2023-03-27

## 2023-04-17 ENCOUNTER — POSTPARTUM VISIT (OUTPATIENT)
Dept: OBGYN CLINIC | Age: 28
End: 2023-04-17

## 2023-04-17 VITALS — BODY MASS INDEX: 23.42 KG/M2 | DIASTOLIC BLOOD PRESSURE: 64 MMHG | SYSTOLIC BLOOD PRESSURE: 118 MMHG | WEIGHT: 154 LBS

## 2023-04-17 PROCEDURE — 0503F POSTPARTUM CARE VISIT: CPT | Performed by: NURSE PRACTITIONER

## 2023-04-17 RX ORDER — SUMATRIPTAN 100 MG/1
TABLET, FILM COATED ORAL
COMMUNITY
Start: 2020-10-22

## 2023-04-17 RX ORDER — SPIRONOLACTONE 25 MG/1
25 TABLET ORAL DAILY
COMMUNITY

## 2023-04-17 NOTE — PROGRESS NOTES
spironolactone (ALDACTONE) 25 MG tablet Take 1 tablet by mouth daily      ibuprofen (ADVIL;MOTRIN) 600 MG tablet Take 1 tablet by mouth every 6 hours as needed for Pain 60 tablet 1    sennosides-docusate sodium (SENOKOT-S) 8.6-50 MG tablet Take 1 tablet by mouth daily 30 tablet 1    acetaminophen (TYLENOL) 500 MG tablet Take 2 tablets by mouth every 6 hours as needed for Pain 120 tablet 1    Prenat w/o J-ZO-Bektvmj-FA-DHA (PNV-DHA PO) Take by mouth       No current facility-administered medications for this visit. Allergies   Allergen Reactions    Penicillins     Penicillins Rash     Other reaction(s): Rash-Severe  As a child  As a child         Health Maintenance   Topic Date Due    COVID-19 Vaccine (3 - Booster for Pfizer series) 04/05/2022    Depression Screen  07/05/2023    Pap smear  07/05/2025    DTaP/Tdap/Td vaccine (8 - Td or Tdap) 11/23/2032    Hib vaccine  Completed    Varicella vaccine  Completed    Meningococcal (ACWY) vaccine  Completed    Flu vaccine  Completed    Hepatitis C screen  Completed    HIV screen  Completed    Hepatitis A vaccine  Aged Out    Pneumococcal 0-64 years Vaccine  Aged Out    Hepatitis B vaccine  Discontinued    HPV vaccine  Discontinued    Chlamydia/GC screen  Discontinued       Subjective:     Review of Systems   Constitutional:  Negative for chills and fever. Respiratory:  Negative for cough and shortness of breath. Cardiovascular:  Negative for chest pain, palpitations and leg swelling. Gastrointestinal:  Negative for abdominal pain, constipation, diarrhea, nausea and vomiting. Genitourinary:  Negative for dysuria, flank pain, pelvic pain, vaginal bleeding, vaginal discharge and vaginal pain. Skin:  Negative for color change and rash. Neurological:  Negative for dizziness and headaches. Hematological:  Negative for adenopathy. Does not bruise/bleed easily. Psychiatric/Behavioral:  Negative for self-injury and suicidal ideas.       Objective:     Physical

## 2023-04-17 NOTE — PATIENT INSTRUCTIONS
Health Information box to learn more about \"Mastitis: Care Instructions. \"     If you do not have an account, please click on the \"Sign Up Now\" link. Current as of: September 5, 2018  Content Version: 12.0  © 2746-1512 [employer]. Care instructions adapted under license by [employer] [facility] ([facility]). If you have questions about a medical condition or this instruction, always ask your healthcare professional. [address] any warranty or liability for your use of this information. Learning About Safe Sleep for Babies  Why is safe sleep important? Enjoy your time with your baby, and know that you can do a few things to keep your baby safe. Following safe sleep guidelines can help prevent sudden infant death syndrome (SIDS) and reduce other sleep-related risks. SIDS is the death of a baby younger than 1 year with no known cause. Talk about these safety steps with your  providers, family, friends, and anyone else who spends time with your baby. Explain in detail what you expect them to do. Do not assume that people who care for your baby know these guidelines. What are the tips for safe sleep? Putting your baby to sleep  Put your baby to sleep on his or her back, not on the side or tummy. This reduces the risk of SIDS. Once your baby learns to roll from the back to the belly, you do not need to keep shifting your baby onto his or her back. But keep putting your baby down to sleep on his or her back. Keep the room at a comfortable temperature so that your baby can sleep in lightweight clothes without a blanket. Usually, the temperature is about right if an adult can wear a long-sleeved T-shirt and pants without feeling cold. Make sure that your baby doesn't get too warm. Your baby is likely too warm if he or she sweats or tosses and turns a lot. Think about giving your baby a pacifier at nap time and bedtime if your doctor agrees.  If your baby is , experts

## 2023-04-18 ASSESSMENT — ENCOUNTER SYMPTOMS
SHORTNESS OF BREATH: 0
NAUSEA: 0
DIARRHEA: 0
VOMITING: 0
CONSTIPATION: 0
COUGH: 0
ABDOMINAL PAIN: 0
COLOR CHANGE: 0

## 2023-05-03 ENCOUNTER — HOSPITAL ENCOUNTER (OUTPATIENT)
Dept: PHYSICAL THERAPY | Facility: CLINIC | Age: 28
Setting detail: THERAPIES SERIES
Discharge: HOME | End: 2023-05-03

## 2023-05-03 NOTE — FLOWSHEET NOTE
[] [facility]) - [facility] &  Therapy  [address]    [phone]  F: [phone]   [] [address]  [facility] [address]   [address]  P: [phone]  F: [phone]  [] [address] &  Therapy  [address]  P: [phone]  F: [phone] [] [address]  P: [phone]  F: [phone]  [] [address] [facility]   [address]   [place]: [phone]  F: [phone]   [] [facility]  [address]  [place]: [phone]   F: [phone]     Physical Therapy Cancel/No Show note    Date: 5/3/2023  Patient: Neal Darling  : 1995  MRN: [id number]    Cancels/No Shows to date:     For today's appointment patient:    [x]  Cancelled    [] Rescheduled appointment    [] No-show     Reason given by patient:    []  Patient ill    []  Conflicting appointment    [] No transportation      [] Conflict with work    [x] No reason given    [] Weather related    [] COVID-19    [] Other:      Comments:        [] Next appointment was confirmed    Electronically signed by: Ryan Velazco

## 2024-02-21 ENCOUNTER — OFFICE VISIT (OUTPATIENT)
Dept: PRIMARY CARE CLINIC | Age: 29
End: 2024-02-21
Payer: COMMERCIAL

## 2024-02-21 VITALS
BODY MASS INDEX: 20.31 KG/M2 | OXYGEN SATURATION: 98 % | HEART RATE: 92 BPM | HEIGHT: 68 IN | DIASTOLIC BLOOD PRESSURE: 68 MMHG | SYSTOLIC BLOOD PRESSURE: 100 MMHG | WEIGHT: 134 LBS

## 2024-02-21 DIAGNOSIS — H10.32 ACUTE BACTERIAL CONJUNCTIVITIS OF LEFT EYE: ICD-10-CM

## 2024-02-21 DIAGNOSIS — N91.2 AMENORRHEA: Primary | ICD-10-CM

## 2024-02-21 LAB
CONTROL: NORMAL
PREGNANCY TEST URINE, POC: NEGATIVE

## 2024-02-21 PROCEDURE — 81025 URINE PREGNANCY TEST: CPT | Performed by: PHYSICIAN ASSISTANT

## 2024-02-21 PROCEDURE — 99214 OFFICE O/P EST MOD 30 MIN: CPT | Performed by: PHYSICIAN ASSISTANT

## 2024-02-21 RX ORDER — GENTAMICIN SULFATE 3 MG/ML
1 SOLUTION/ DROPS OPHTHALMIC 4 TIMES DAILY
Qty: 5 ML | Refills: 0 | Status: SHIPPED | OUTPATIENT
Start: 2024-02-21 | End: 2024-02-28

## 2024-02-21 SDOH — ECONOMIC STABILITY: FOOD INSECURITY: WITHIN THE PAST 12 MONTHS, THE FOOD YOU BOUGHT JUST DIDN'T LAST AND YOU DIDN'T HAVE MONEY TO GET MORE.: NEVER TRUE

## 2024-02-21 SDOH — ECONOMIC STABILITY: INCOME INSECURITY: HOW HARD IS IT FOR YOU TO PAY FOR THE VERY BASICS LIKE FOOD, HOUSING, MEDICAL CARE, AND HEATING?: NOT HARD AT ALL

## 2024-02-21 SDOH — ECONOMIC STABILITY: FOOD INSECURITY: WITHIN THE PAST 12 MONTHS, YOU WORRIED THAT YOUR FOOD WOULD RUN OUT BEFORE YOU GOT MONEY TO BUY MORE.: NEVER TRUE

## 2024-02-21 SDOH — ECONOMIC STABILITY: HOUSING INSECURITY
IN THE LAST 12 MONTHS, WAS THERE A TIME WHEN YOU DID NOT HAVE A STEADY PLACE TO SLEEP OR SLEPT IN A SHELTER (INCLUDING NOW)?: NO

## 2024-02-21 ASSESSMENT — PATIENT HEALTH QUESTIONNAIRE - PHQ9
SUM OF ALL RESPONSES TO PHQ QUESTIONS 1-9: 0
SUM OF ALL RESPONSES TO PHQ9 QUESTIONS 1 & 2: 0
1. LITTLE INTEREST OR PLEASURE IN DOING THINGS: 0
SUM OF ALL RESPONSES TO PHQ QUESTIONS 1-9: 0
SUM OF ALL RESPONSES TO PHQ QUESTIONS 1-9: 0
2. FEELING DOWN, DEPRESSED OR HOPELESS: 0
SUM OF ALL RESPONSES TO PHQ QUESTIONS 1-9: 0

## 2024-02-21 ASSESSMENT — ENCOUNTER SYMPTOMS
EYE ITCHING: 1
EYE REDNESS: 1
CONSTIPATION: 0
DIARRHEA: 0
EYE DISCHARGE: 1
COUGH: 0
CHEST TIGHTNESS: 0
SHORTNESS OF BREATH: 0
EYE PAIN: 0
SORE THROAT: 0

## 2024-06-18 ENCOUNTER — OFFICE VISIT (OUTPATIENT)
Dept: PRIMARY CARE CLINIC | Age: 29
End: 2024-06-18
Payer: COMMERCIAL

## 2024-06-18 VITALS
OXYGEN SATURATION: 98 % | BODY MASS INDEX: 20.22 KG/M2 | HEART RATE: 83 BPM | HEIGHT: 68 IN | SYSTOLIC BLOOD PRESSURE: 108 MMHG | WEIGHT: 133.4 LBS | DIASTOLIC BLOOD PRESSURE: 64 MMHG

## 2024-06-18 DIAGNOSIS — D36.9 DERMOID CYST: Primary | ICD-10-CM

## 2024-06-18 PROCEDURE — 99213 OFFICE O/P EST LOW 20 MIN: CPT | Performed by: PHYSICIAN ASSISTANT

## 2024-06-18 ASSESSMENT — ENCOUNTER SYMPTOMS: SORE THROAT: 0

## 2024-06-18 NOTE — PROGRESS NOTES
[facility]  [facility] PRIMARY CARE  [address] [facility] [address]  Dept: [phone]    Maureen Pardomorross is a 28 y.o. female Established patient, who presents today for her medical conditions/complaints as noted below.      Chief Complaint   Patient presents with    Cyst     Patient has had a small lump on left back side shoulder area for 4 years but seems to be growing and changing color.       HPI:     HPI: The patient is a pleasant 28-year-old female who presents today with concerns of small lump on the backside shoulder area.  Has been there for about 4 years but seems to be growing and changing color.     Was similar to a zit. Has not used anything for this. Grandpa had skin cancer.  It is painful.     Reviewed prior notes None  Reviewed previous Labs.     No components found for: \"LDLCHOLESTEROL\", \"LDLCALC\"    (goal LDL is <100)   AST (U/L)   Date Value   02/09/2023 35 (H)     ALT (U/L)   Date Value   02/09/2023 12     BUN (mg/dL)   Date Value   02/09/2023 8     TSH (uIU/mL)   Date Value   06/30/2022 0.94     BP Readings from Last 3 Encounters:   06/18/24 108/64   02/21/24 100/68   04/17/23 118/64          (goal 120/80)  No results found for: \"LABA1C\"  Past Medical History:   Diagnosis Date    Migraines       Past Surgical History:   Procedure Laterality Date    WISDOM TOOTH EXTRACTION         Family History   Problem Relation Age of Onset    No Known Problems Mother     Heart Disease Father     No Known Problems Sister     No Known Problems Brother     No Known Problems Maternal Aunt     No Known Problems Maternal Uncle     No Known Problems Paternal Aunt     No Known Problems Paternal Uncle     No Known Problems Maternal Grandmother     No Known Problems Maternal Grandfather     Heart Disease Paternal Grandmother     Heart Disease Paternal Grandfather     No Known Problems Maternal Cousin     No Known Problems Paternal Cousin     No Known Problems Other

## 2024-06-27 ENCOUNTER — TELEPHONE (OUTPATIENT)
Dept: OBGYN CLINIC | Age: 29
End: 2024-06-27

## 2024-06-27 NOTE — TELEPHONE ENCOUNTER
Maureen SanchezAdannigel calling reporting a positive pregnancy test.    Maureen Acuna is not a new patient.    This is not Maureen Acuna's first pregnancy.    ** Please complete if patient has previous birth history, please delete is not applicable**  Number of pregnancies: 2  Mode of delivery (vaginal//both) VAG  If NEW patient please instruct patient will need prior OBGYN records.     Maureen Acuna last menstrual cycle: 24  Based on LMP patient is 7W4D weeks     Dose patient have any concerns?   []YES  [x]NO  If yes, please discuss concern with provider to determine if patient needs additional appointment.      Scheduling Instructions and Education  [x]If patient less than 8 weeks please schedule missed menses (30 mins) between 6-8 weeks. ALSO scheduled USN w/ IPV (w/ NURSE) to follow 2-3 weeks after missed menses   Patient education: Initial missed menses appointment will consist of a pregnancy test and to establish care and review previous births **if applicable**. There will NOT be an ultrasound at this first visit. Please review that the USN and IPV visit will be 2-3 weeks after patient's missed menses. At this appointment dating ultrasound will be complete, prenatal labs ordered, prenatal education completed, pelvic exam if indicated     Please document appointments scheduled during phone call   Missed menses date/provider:  DAHLIA  USN/IPV date:USN  1:00, IPV 1:45    Please forward telephone encounter to provider appointments are scheduled with prior to closing telephone encounter.

## 2024-07-02 ENCOUNTER — OFFICE VISIT (OUTPATIENT)
Age: 29
End: 2024-07-02
Payer: COMMERCIAL

## 2024-07-02 VITALS
DIASTOLIC BLOOD PRESSURE: 75 MMHG | SYSTOLIC BLOOD PRESSURE: 117 MMHG | HEIGHT: 68 IN | OXYGEN SATURATION: 98 % | HEART RATE: 95 BPM | RESPIRATION RATE: 16 BRPM | BODY MASS INDEX: 20.46 KG/M2 | WEIGHT: 135 LBS

## 2024-07-02 DIAGNOSIS — L72.0 EPIDERMAL CYST OF NECK: Primary | ICD-10-CM

## 2024-07-02 PROCEDURE — 99203 OFFICE O/P NEW LOW 30 MIN: CPT | Performed by: NURSE PRACTITIONER

## 2024-07-02 ASSESSMENT — ENCOUNTER SYMPTOMS
NAUSEA: 0
RHINORRHEA: 0
ROS SKIN COMMENTS: SEE HPI.
ABDOMINAL PAIN: 0
VOMITING: 0
COUGH: 0
SHORTNESS OF BREATH: 0

## 2024-07-02 NOTE — PATIENT INSTRUCTIONS
[facility] GENERAL SURGERY   [facility]  [facility] [facility] [address]  Dept: [phone]  Dept Fax: [phone]         SURGICAL REQUIREMENTS :    - If you have any coverage / billing questions before your surgery please contact your insurance directly.   - As discussed with the surgery scheduler and/ or your provider, surgical clearances may be required for your surgical procedure. We will let you know if this is needed at the time of your consult.  -Pre- admission testing is required for all surgeries either by phone or in person depending on the type of surgery you are having.   - Please note pre-admission testing may require surgical clearance per their guidelines.  -Our surgery schedulers will schedule this for you and provide you with the date, time and if it is in person or over the phone.   -Our surgery schedulers will also provide you with an appointment date and time for follow up in our office 2-3 weeks after your procedure.     -If surgical clearance is required, a letter requesting this will be faxed to your provider(s).   Please follow up with your provider(s) to schedule the necessary appointments needed to obtain the clearance.    -Please note a clearance can be required through pre-admission testing or any provider that takes part in your care. Required clearances not obtained prior to surgery may result in cancellation or rescheduling of your surgery.    -Any FMLA, short term disability paperwork or work notes needed will not be completed until the day of your scheduled surgery, please allow 24-48 hours for these to be completed and signed by provider.     [facility].

## 2024-07-02 NOTE — PROGRESS NOTES
[de-identified] : Referred by:  Dr. Christian Rondon\par \par 53 y/o M with a hx of blood clots presents for an evaluation of left knee pain. Recent MRI which showed a OCD lesion and also patient had undergone arthroscopic surgery by another physician at [facility] recently.
cyst of neck  L72.0           Return for Post Op surgery.    The patient, Maureen Acuna is a 28 y.o. female, was seen with a total time spent of 35 minutes for the visit on this date of service by the E/M provider. The time component had both face to face and non face to face time spent in determining the total time component.  Counseling and education regarding the patient's diagnosis listed below and the patient's options regarding those diagnoses were also included in determining the time component.      Electronically signed by LEWIS Marcus CNP  on 7/2/2024 at 2:20 PM

## 2024-07-10 ENCOUNTER — TELEPHONE (OUTPATIENT)
Age: 29
End: 2024-07-10

## 2024-07-10 NOTE — TELEPHONE ENCOUNTER
Called patient to schedule a procedure.    TM/SC/ 2024 AT 12:45 PM/  EXCISION EPIDERMOID CYST LEFT POSTERIOR NECK./ FEI Salas    PATIENT FULLY INSTRUCTED / WILL VIEW INSTRUCTIONS ON MY CHART      [facility]   Jamel Sutton MD, FACS  Trista Estrada, APRN-CNP  [address], [address]  [place] [address]  P: [phone], F: [phone]      STOP ASPIRIN 7 DAYS PRIOR TO PROCEDURE  STOP ALL OTHER BLOOD THINNERS 5 DAYS PRIOR TO PROCEDURE  NOTHING TO EAT, DRINK, SMOKE, OR CHEW AFTER MIDNIGHT  You may brush your teeth, rinse gargle, and spit  Heart or blood pressure medication ONLY with a  small sip of water, unless otherwise directed  Shower with regular soap and water  YOU MUST HAVE AN ADULT EITHER DRIVE YOU OR RIDE IN A CAB WITH YOU            MY EXAM IS SCHEDULED FOR;      Pre-testin2024 T 10:15 AM A NURSE FROM [facility] CALL YOU    SURGERY DATE:  2024    TIME:  12:45 PM    ARRIVAL TIME:  11:30 AM    LOCATION:  [facility] Outpatient [facility] [place]

## 2024-07-11 ENCOUNTER — TELEPHONE (OUTPATIENT)
Age: 29
End: 2024-07-11

## 2024-07-11 ENCOUNTER — OFFICE VISIT (OUTPATIENT)
Dept: OBGYN CLINIC | Age: 29
End: 2024-07-11

## 2024-07-11 ENCOUNTER — HOSPITAL ENCOUNTER (OUTPATIENT)
Age: 29
Setting detail: SPECIMEN
Discharge: HOME | End: 2024-07-11

## 2024-07-11 ENCOUNTER — TELEPHONE (OUTPATIENT)
Dept: OBGYN CLINIC | Age: 29
End: 2024-07-11

## 2024-07-11 VITALS
HEIGHT: 68 IN | DIASTOLIC BLOOD PRESSURE: 66 MMHG | SYSTOLIC BLOOD PRESSURE: 102 MMHG | WEIGHT: 138 LBS | BODY MASS INDEX: 20.92 KG/M2

## 2024-07-11 DIAGNOSIS — N92.6 MISSED MENSES: ICD-10-CM

## 2024-07-11 DIAGNOSIS — Z87.59 HISTORY OF GESTATIONAL HYPERTENSION: ICD-10-CM

## 2024-07-11 DIAGNOSIS — N92.6 MISSED MENSES: Primary | ICD-10-CM

## 2024-07-11 LAB
AMPHET UR QL SCN: NEGATIVE
BARBITURATES UR QL SCN: NEGATIVE
BENZODIAZ UR QL: NEGATIVE
CANNABINOIDS UR QL SCN: NEGATIVE
COCAINE UR QL SCN: NEGATIVE
CONTROL: ABNORMAL
FENTANYL UR QL: NEGATIVE
METHADONE UR QL: NEGATIVE
OPIATES UR QL SCN: NEGATIVE
OXYCODONE UR QL SCN: NEGATIVE
PCP UR QL SCN: NEGATIVE
PREGNANCY TEST URINE, POC: ABNORMAL
TEST INFORMATION: NORMAL

## 2024-07-11 NOTE — PROGRESS NOTES
[facility] OB/GYN [address]  [facility] [address]  Dept: [phone]    Patient Name: Maureen Acuna  Patient Age: 28 y.o.  Date of Visit: 2024    SUBJECTIVE:    Chief Complaint:  Chief Complaint   Patient presents with    Amenorrhea       HPI:    Maureen  is being seen today for missed menses.    This  is a planned pregnancy.  She is  accepting at this time.  LMP: Patient's last menstrual period was 2024 (approximate). Sure and definite: No    28 day cycle: No    She was not on a contraceptive at the time of conception.  Estimated weeks gestation today : approx 7w5d  Tentative MITCHELL: approx    Relationship with FOB: 25    Patient reports concerns: no    OB History          1    Para   1    Term   1            AB        Living   1         SAB        IAB        Ectopic        Molar        Multiple   0    Live Births   1              Past Medical History:   Diagnosis Date    Migraines      No current outpatient medications on file.     No current facility-administered medications for this visit.         OBJECTIVE:    /66 (Site: Right Upper Arm, Position: Sitting, Cuff Size: Medium Adult)   Ht 1.727 m (5' 8\")   Wt 62.6 kg (138 lb)   LMP 2024 (Approximate)   BMI 20.98 kg/m²     She is complaining today of:   Pain: No  Cramping: No  Bleeding or spotting: No    Nausea: Yes  Vomiting: No    Breast enlargement/tenderness: No  Fatigue: No  Frequency of urination: No    Previous high risk obstetrical history: gHTN  OB History    Para Term  AB Living   1 1 1     1   SAB IAB Ectopic Molar Multiple Live Births           0 1      # Outcome Date GA Lbr Alejandro/2nd Weight Sex Delivery Anes PTL Lv   1 Term 23 40w1d / 01:51 3.735 kg (8 lb 3.8 oz) M Vag-Spont EPI N KLAUS       History was reviewed as documented on Epic Navigator.    ASSESSMENT/PLAN:  Missed Menses  - UCG was done and noted 
1-2 times (2)  [x] 3-4 times (3)  [] 5-6 times (4)  [] 7 or more times (5)    Total: 7 moderate nausea and vomiting in pregnancy    Mild NVP: 6 or less  Moderate NVP: 7-12  Severe NVP: 13 or more  (Adapted from ACOG Practice Bulletin 189)               No data to display              Interpretation of SHAE-7 score: 5-9 = mild anxiety, 10-14 = moderate anxiety, 15+ = severe anxiety. Recommend referral to behavioral health for scores 10 or greater.

## 2024-07-11 NOTE — TELEPHONE ENCOUNTER
Called Dr. Tran office spoke Viv, and informed her that a medical clearance dent over for the patient. Viv stated she will give it Yazmin  the clearance. Yazmin takes care of all clearances.

## 2024-07-11 NOTE — TELEPHONE ENCOUNTER
Medical Clearance Rec'd from Dr Tran OB Gyn   [facility]  Progress Note   Nursery    Patient Name:  Jeramie London  MRN: [id number]  Admission Date: 2019      Subjective:     Stable, no events noted overnight.  Formula feeding more than breastfeeding.     Feeding: Breastmilk and formula   Infant is voiding and stooling.    Objective:     Vital Signs (Most Recent)  Temp: 98.6 °F (37 °C) (19)  Pulse: 120 (19)  Resp: 40 (19)    Most Recent Weight: 3925 g (8 lb 10.5 oz) (19)  Percent Weight Change Since Birth: -0.4     Physical Exam   General Appearance: Healthy-appearing, vigorous infant, , no dysmorphic features  Head: Normocephalic, atraumatic, anterior fontanelle open soft and flat  Eyes: PERRL, red reflex present bilaterally, anicteric sclera, no discharge  Ears: Well-positioned, well-formed pinnae    Nose:  nares patent, no rhinorrhea  Throat: oropharynx clear, non-erythematous, mucous membranes moist, palate intact  Neck: Supple, symmetrical, no torticollis  Chest: Lungs clear to auscultation, respirations unlabored    Heart: Regular rate & rhythm, normal S1/S2, Grade II/VI harsh EROS, no rubs, or gallops  Abdomen: positive bowel sounds, soft, non-tender, non-distended, no masses, umbilical stump clean  Pulses: Strong equal femoral and brachial pulses, brisk capillary refill  Hips: Negative Will & Ortolani, gluteal creases equal  : Normal Sai I female genitalia, anus patent  Musculosketal: no josefina or dimples, no scoliosis or masses, clavicles intact  Extremities: Well-perfused, warm and dry, no cyanosis  Skin: no rashes, no jaundice  Neuro: strong cry, good symmetric tone and strength; positive vasile, root and suck    Labs:  Recent Results (from the past 24 hour(s))   POCT glucose    Collection Time: 19  2:11 PM   Result Value Ref Range    POCT Glucose 45 (LL) 70 - 110 mg/dL   POCT glucose    Collection Time: 19  5:15 PM   Result Value Ref Range    POCT Glucose 60  (L) 70 - 110 mg/dL   POCT glucose    Collection Time: 19  8:25 PM   Result Value Ref Range    POCT Glucose 62 (L) 70 - 110 mg/dL   Bilirubin, Total,     Collection Time: 19  9:51 AM   Result Value Ref Range    Bilirubin, Total -  5.5 0.1 - 6.0 mg/dL       Assessment and Plan:     39w1d  , doing well. Continue routine  care.    * Liveborn infant, born in hospital,  delivery  Term, LGA, c/s for breech presentation.  Formula feeding more than breastfeeding. Counseled her on importance of latching baby to breast to stimulate milk production.      Large for gestational age   Borderline with weight in 89.94% ile. Treating as LGA, blood sugars WNL.    Breech presentation  Hip exam normal. Should have u/s at 6 weeks of age.    Heart murmur of   Still present today. Will order Echocardiogram.         Stanley Irby MD  Pediatrics  [facility]

## 2024-07-12 ENCOUNTER — HOSPITAL ENCOUNTER (OUTPATIENT)
Dept: PREADMISSION TESTING | Age: 29
Discharge: HOME | End: 2024-07-16

## 2024-07-12 VITALS — WEIGHT: 135 LBS | HEIGHT: 68 IN | BODY MASS INDEX: 20.46 KG/M2

## 2024-07-12 LAB
CHLAMYDIA DNA UR QL NAA+PROBE: NEGATIVE
N GONORRHOEA DNA UR QL NAA+PROBE: NEGATIVE
SPECIMEN DESCRIPTION: NORMAL

## 2024-07-12 NOTE — PROGRESS NOTES
will be prepared for surgery.  A physical assessment will be performed by a nurse practitioner or house officer.  Your IV will be started and you will meet your anesthesiologist.    When you go to surgery, your family will be directed to the surgical waiting room, where the doctor should speak with them after your surgery.    After surgery, you will be taken to the recovery area.  When you are alert and stable, you will receive instructions and be prepared for discharge.     If you use a Bi-PAP or C-PAP machine, please bring it with you and leave it in the car in case it is needed in recovery room.    INSTRUCTIONS REVIEWED WITH HAILEE, UNDERSTANDING VERBALIZED.  EXCISION EPIDERMOID CYST LEFT POSTERIOR NECK- 07/19/2024

## 2024-07-16 ENCOUNTER — INITIAL PRENATAL (OUTPATIENT)
Dept: OBGYN CLINIC | Age: 29
End: 2024-07-16

## 2024-07-16 ENCOUNTER — HOSPITAL ENCOUNTER (OUTPATIENT)
Age: 29
Setting detail: SPECIMEN
Discharge: HOME | End: 2024-07-16

## 2024-07-16 VITALS
BODY MASS INDEX: 20.65 KG/M2 | WEIGHT: 135.8 LBS | SYSTOLIC BLOOD PRESSURE: 94 MMHG | HEART RATE: 81 BPM | DIASTOLIC BLOOD PRESSURE: 60 MMHG

## 2024-07-16 DIAGNOSIS — Z3A.01 7 WEEKS GESTATION OF PREGNANCY: ICD-10-CM

## 2024-07-16 DIAGNOSIS — Z3A.01 7 WEEKS GESTATION OF PREGNANCY: Primary | ICD-10-CM

## 2024-07-16 PROCEDURE — 0500F INITIAL PRENATAL CARE VISIT: CPT | Performed by: ADVANCED PRACTICE MIDWIFE

## 2024-07-16 RX ORDER — ASPIRIN 81 MG/1
81 TABLET ORAL DAILY
Qty: 90 TABLET | Refills: 1 | Status: SHIPPED | OUTPATIENT
Start: 2024-07-16

## 2024-07-16 SDOH — ECONOMIC STABILITY: HOUSING INSECURITY: IN THE LAST 12 MONTHS, HOW MANY PLACES HAVE YOU LIVED?: 1

## 2024-07-16 SDOH — ECONOMIC STABILITY: TRANSPORTATION INSECURITY
IN THE PAST 12 MONTHS, HAS THE LACK OF TRANSPORTATION KEPT YOU FROM MEDICAL APPOINTMENTS OR FROM GETTING MEDICATIONS?: NO

## 2024-07-16 SDOH — ECONOMIC STABILITY: FOOD INSECURITY: WITHIN THE PAST 12 MONTHS, THE FOOD YOU BOUGHT JUST DIDN'T LAST AND YOU DIDN'T HAVE MONEY TO GET MORE.: NEVER TRUE

## 2024-07-16 SDOH — ECONOMIC STABILITY: TRANSPORTATION INSECURITY
IN THE PAST 12 MONTHS, HAS LACK OF TRANSPORTATION KEPT YOU FROM MEETINGS, WORK, OR FROM GETTING THINGS NEEDED FOR DAILY LIVING?: NO

## 2024-07-16 SDOH — ECONOMIC STABILITY: FOOD INSECURITY: WITHIN THE PAST 12 MONTHS, YOU WORRIED THAT YOUR FOOD WOULD RUN OUT BEFORE YOU GOT MONEY TO BUY MORE.: NEVER TRUE

## 2024-07-16 SDOH — ECONOMIC STABILITY: INCOME INSECURITY: IN THE LAST 12 MONTHS, WAS THERE A TIME WHEN YOU WERE NOT ABLE TO PAY THE MORTGAGE OR RENT ON TIME?: NO

## 2024-07-16 ASSESSMENT — SOCIAL DETERMINANTS OF HEALTH (SDOH)
WITHIN THE LAST YEAR, HAVE TO BEEN RAPED OR FORCED TO HAVE ANY KIND OF SEXUAL ACTIVITY BY YOUR PARTNER OR EX-PARTNER?: NO
WITHIN THE LAST YEAR, HAVE YOU BEEN KICKED, HIT, SLAPPED, OR OTHERWISE PHYSICALLY HURT BY YOUR PARTNER OR EX-PARTNER?: NO
WITHIN THE LAST YEAR, HAVE YOU BEEN HUMILIATED OR EMOTIONALLY ABUSED IN OTHER WAYS BY YOUR PARTNER OR EX-PARTNER?: NO
HOW HARD IS IT FOR YOU TO PAY FOR THE VERY BASICS LIKE FOOD, HOUSING, MEDICAL CARE, AND HEATING?: NOT HARD AT ALL
WITHIN THE LAST YEAR, HAVE YOU BEEN AFRAID OF YOUR PARTNER OR EX-PARTNER?: NO

## 2024-07-16 ASSESSMENT — ANXIETY QUESTIONNAIRES
7. FEELING AFRAID AS IF SOMETHING AWFUL MIGHT HAPPEN: NOT AT ALL
2. NOT BEING ABLE TO STOP OR CONTROL WORRYING: NOT AT ALL
5. BEING SO RESTLESS THAT IT IS HARD TO SIT STILL: NOT AT ALL
3. WORRYING TOO MUCH ABOUT DIFFERENT THINGS: NOT AT ALL
6. BECOMING EASILY ANNOYED OR IRRITABLE: NOT AT ALL
4. TROUBLE RELAXING: NOT AT ALL
IF YOU CHECKED OFF ANY PROBLEMS ON THIS QUESTIONNAIRE, HOW DIFFICULT HAVE THESE PROBLEMS MADE IT FOR YOU TO DO YOUR WORK, TAKE CARE OF THINGS AT HOME, OR GET ALONG WITH OTHER PEOPLE: NOT DIFFICULT AT ALL
1. FEELING NERVOUS, ANXIOUS, OR ON EDGE: NOT AT ALL
GAD7 TOTAL SCORE: 0

## 2024-07-16 NOTE — PROGRESS NOTES
C No     Live with Someone with or Exposed to TB? No     History of STD/GC/Chlamydia/HPV/Syphilis? No     Patient or Partner has Hx of Genital Herpes? No     History of Chickenpox No     History of MRSA No     Risk of Toxoplasmosis No     Risk of CMV No     List of other infections      Rash or Viral Illness Since LMP? No     Additional comments      Patient or partner has Hepatitis B No         Previous Birth History:   Vaginal Birth: yes   Birth: no  Any previous birth complications: no  History of hemorrhage during/after birth: no  History of bleeding disorders: No    High Risk Factor Screening for this Pregnancy:  Age greater than 35 at delivery: No  History of  delivery: no  History of bleeding disorders: No  Objection to receiving blood products: No  History of diabetes (gestational or outside of pregnancy): No, On medications: No  Screening for pregestational diabetes:   BMI greater than 30: No. If Yes, need early glucola  BMI greater than 25 ( Americans greater than 23): No If Yes, need 1 more risk factor.   Physical inactivity: No  First-degree relative with diabetes: No  High-risk race of ethnicity (eg, , , ,  American, ): No  Previously given birth to an infant weighing 3eow85ap (4000g) or more: No  History of hypertension: Yes  HDL < 35mg/dL and/or a triglyceride level greater than 250 mg/dL: NA  History of polycystic ovarian syndrome: No  A1c greater than or equal to 5.7%: NA    Assessment/Plan:    No pregnancy checklist tasks were completed during this visit, and no tasks are pending completion.       Pregnancy at 7 WEEKS 1 DAY   She was counseled on office policies. She was educated about the anticipated course of prenatal care and routine prenatal labs.   Patient has consented to HIV testing and urine drug screen: Yes  Initial Pathways Screen was completed: No  Pregnancy Education Checklist initiated and documented

## 2024-07-17 LAB
MICROORGANISM SPEC CULT: NO GROWTH
SERVICE CMNT-IMP: NORMAL
SPECIMEN DESCRIPTION: NORMAL

## 2024-07-18 NOTE — PRE-PROCEDURE INSTRUCTIONS
No answer, left message ?                             Unable to leave message ?    When were you told to arrive at hospital ?  1045    Do you have a  ?y    Are you on any blood thinners ?                     If yes when did you stop taking ?    Do you have your prep Rx filled and instruction ?  n    Nothing to eat the day before , only clear liquids.    Are you experiencing any covid symptoms ? n    Do you have any infections or rash we should be aware of ?      Do you have the Hibiclens soap to use the night before and the morning of surgery ?    Nothing to eat or drink after midnight, only a sip of water to take any medication instructed to take the night before.  Wear comfortable clothing, leave any valuables at home, remove any jewelry and body piercing . y

## 2024-07-19 ENCOUNTER — HOSPITAL ENCOUNTER (OUTPATIENT)
Age: 29
Setting detail: OUTPATIENT SURGERY
Discharge: HOME | End: 2024-07-19
Attending: SURGERY | Admitting: SURGERY
Payer: COMMERCIAL

## 2024-07-19 VITALS
DIASTOLIC BLOOD PRESSURE: 58 MMHG | SYSTOLIC BLOOD PRESSURE: 114 MMHG | HEART RATE: 67 BPM | WEIGHT: 135 LBS | OXYGEN SATURATION: 100 % | RESPIRATION RATE: 16 BRPM | TEMPERATURE: 98 F | BODY MASS INDEX: 20.46 KG/M2 | HEIGHT: 68 IN

## 2024-07-19 DIAGNOSIS — L72.0 EPIDERMOID CYST OF NECK: ICD-10-CM

## 2024-07-19 DIAGNOSIS — L72.0 EPIDERMAL CYST OF NECK: Primary | ICD-10-CM

## 2024-07-19 PROCEDURE — 88304 TISSUE EXAM BY PATHOLOGIST: CPT

## 2024-07-19 PROCEDURE — 11423 EXC H-F-NK-SP B9+MARG 2.1-3: CPT | Performed by: SURGERY

## 2024-07-19 PROCEDURE — 2709999900 HC NON-CHARGEABLE SUPPLY: Performed by: SURGERY

## 2024-07-19 PROCEDURE — 3600000002 HC SURGERY LEVEL 2 BASE: Performed by: SURGERY

## 2024-07-19 PROCEDURE — 7100000010 HC PHASE II RECOVERY - FIRST 15 MIN: Performed by: SURGERY

## 2024-07-19 PROCEDURE — 3600000012 HC SURGERY LEVEL 2 ADDTL 15MIN: Performed by: SURGERY

## 2024-07-19 PROCEDURE — 6360000002 HC RX W HCPCS: Performed by: SURGERY

## 2024-07-19 RX ORDER — OXYCODONE HYDROCHLORIDE AND ACETAMINOPHEN 5; 325 MG/1; MG/1
1 TABLET ORAL EVERY 6 HOURS PRN
Qty: 28 TABLET | Refills: 0 | Status: SHIPPED | OUTPATIENT
Start: 2024-07-19 | End: 2024-07-26

## 2024-07-19 RX ORDER — BUPIVACAINE HYDROCHLORIDE 5 MG/ML
INJECTION, SOLUTION EPIDURAL; INTRACAUDAL PRN
Status: DISCONTINUED | OUTPATIENT
Start: 2024-07-19 | End: 2024-07-19 | Stop reason: ALTCHOICE

## 2024-07-19 RX ORDER — CEPHALEXIN 500 MG/1
CAPSULE ORAL
Qty: 21 CAPSULE | Refills: 0 | Status: SHIPPED | OUTPATIENT
Start: 2024-07-19

## 2024-07-19 ASSESSMENT — ENCOUNTER SYMPTOMS
ROS SKIN COMMENTS: SEE HPI
ABDOMINAL PAIN: 0
NAUSEA: 0
SHORTNESS OF BREATH: 0
SINUS PRESSURE: 0

## 2024-07-19 ASSESSMENT — PAIN - FUNCTIONAL ASSESSMENT: PAIN_FUNCTIONAL_ASSESSMENT: 0-10

## 2024-07-19 NOTE — DISCHARGE INSTRUCTIONS
Dr. Sutton Post-Op Orders for Outpatient    1.) Diet:    [x]  As tolerated  []  Special     2.) Activity:    []  No lifting more than five to ten pounds  weeks  [x]  May Shower tomorrow  [x]  No driving until off pain medications     3.) Dressing:    [x]  Remove top dressing in 48 hours   [x]  Leave steri strips on     [x]  Remove and change dressing sooner if soaked    4.) Medication:    [x]  Take medication as prescribed   []  Resume blood thinners in  days    [x]  Resume home medications per AVS Summary    5.) Office visit: Follow up with Dr. Sutton. Call to schedule an appointment if one has not been made.     6.) Call [phone] if you have any questions or concerns.    Electronically signed by Jamel Sutton MD on 7/19/2024 at 2:01 PM

## 2024-07-19 NOTE — OP NOTE
[facility] General Surgery   Jamel Sutton MD, FACS  Trista ROMANTomy Sean, APRN-CNP  [address], [address]  [place] [address]  P: [phone], F: [phone]      Preoperative diagnosis: Symptomatic epidermal cyst left posterior neck    Postoperative diagnosis: Same    Procedure: Excision symptomatic epidermal inclusion cyst 2 x 1.5 x 1 cm left posterior neck    Surgeon: Dr. Sutton    First Assist: None    Anesthesia: Local    Preparation: Chloraprep    EBL: Less than 10 mL    Specimen: Epidermal inclusion cyst    Procedure: Informed consent was obtained.  Site was marked and confirmed.  Patient was taken to the operating room.  She was lying in a right lateral position.  Vital signs were monitored remained stable throughout the procedure.  Operative site was prepped and draped in usual sterile fashion.  Timeout was done.  Local anesthetic was infiltrated at the marked site.  Incision was made using a #15 blade.  Bovie cautery was used.  Dissection was carried out.  Approximately 2 x 1.5 x 1 cm epidermal cyst was excised.  Wound was explored.  Hemostasis was confirmed.  Sponge needle instrument count was found to be correct.  Specimen was sent to pathology.  After confirming hemostasis sponge needle instrument count wound was approximated using absorbable sutures.  Dermabond was applied.  Steri-Strips were applied.  Sterile dressing was applied.  Patient tolerated procedure well and was transferred to the recovery room in a stable condition.    Recommendations: Findings discussed with the patient.  Family was not available to discuss the findings.  Prescriptions called in.  Discharge instructions in the chart.  Office follow-up discussed.

## 2024-07-19 NOTE — H&P
HISTORY and PHYSICAL  [facility]       NAME:  Maureen Acuna  MRN: [id number]   YOB: 1995   Date: 7/19/2024   Age: 28 y.o.  Gender: female       COMPLAINT AND PRESENT HISTORY:     Maureen Acuna is 28 y.o.,  female,  here for: Pre-Op Diagnosis Codes:     * Epidermoid cyst of neck     Pt will be having:     Procedure(s):  EXCISION EPIDERMOID CYST LEFT POSTERIOR NECK and will be done under   Local anesthetic .    Patient  discovered this Cyst approx 4-5 years, states started off as ingrown hair with inflammed pea size     Patient has not had any similar lesions    . Patient reports that the Cyst  has increased in size.     Patient denies any pain, redness, itchiness, swelling or drainage from the Cystic  site.      Patient reports FH of skin cancer in maternal grandmother and aunt.    Patient denies any injury or trauma to the area.     Pt denies any hx of MRSA in the past.      SIGNIFICANT MEDICAL HISTORY reviewed.     Patient denies any chest pain/pressure. . Pt reports some SOB. Related to being pregnant.  No recent URI. Fever or chills.     Patient has crackers this am.     RECENT LABS, IMAGING AND TESTING     Lab Results   Component Value Date    WBC 15.9 (H) 02/10/2023    RBC 3.54 (L) 02/10/2023    HGB 10.8 (L) 02/10/2023    HCT 32.6 (L) 02/10/2023    MCV 92.1 02/10/2023    MCH 30.5 02/10/2023    MCHC 33.1 02/10/2023    RDW 13.9 02/10/2023     02/10/2023    MPV 9.9 02/10/2023        Lab Results   Component Value Date     02/09/2023    K 3.9 02/09/2023     02/09/2023    CO2 17 (L) 02/09/2023    BUN 8 02/09/2023    CREATININE 0.62 02/09/2023    GLUCOSE 117 (H) 02/09/2023    CALCIUM 9.2 02/09/2023    BILITOT 0.8 02/09/2023    ALKPHOS 152 (H) 02/09/2023    AST 35 (H) 02/09/2023    ALT 12 02/09/2023         PAST MEDICAL HISTORY     Past Medical History:   Diagnosis Date    Migraines        SURGICAL HISTORY       Past Surgical History:   Procedure

## 2024-07-20 ENCOUNTER — PATIENT MESSAGE (OUTPATIENT)
Dept: OBGYN CLINIC | Age: 29
End: 2024-07-20

## 2024-07-22 NOTE — TELEPHONE ENCOUNTER
From: Maureen Acuna  To: Elma Galo APRN - CNM  Sent: 7/20/2024 9:42 AM EDT  Subject: Medication question    Hi Elma- I was prescribed to take cephALEXin 500 MG three times daily after my surgery. Am I able to take this while pregnant?     Thank you

## 2024-07-24 LAB — SURGICAL PATHOLOGY REPORT: NORMAL

## 2024-08-05 ENCOUNTER — HOSPITAL ENCOUNTER (OUTPATIENT)
Age: 29
Setting detail: SPECIMEN
Discharge: HOME | End: 2024-08-05

## 2024-08-05 DIAGNOSIS — Z3A.01 7 WEEKS GESTATION OF PREGNANCY: ICD-10-CM

## 2024-08-05 LAB
ABO + RH BLD: NORMAL
ALBUMIN SERPL-MCNC: 4.7 G/DL (ref 3.5–5.2)
ALBUMIN/GLOB SERPL: 2 {RATIO} (ref 1–2.5)
ALP SERPL-CCNC: 56 U/L (ref 35–104)
ALT SERPL-CCNC: 15 U/L (ref 10–35)
ANION GAP SERPL CALCULATED.3IONS-SCNC: 15 MMOL/L (ref 9–16)
AST SERPL-CCNC: 19 U/L (ref 10–35)
BILIRUB SERPL-MCNC: 0.5 MG/DL (ref 0–1.2)
BLOOD GROUP ANTIBODIES SERPL: NEGATIVE
BUN SERPL-MCNC: 11 MG/DL (ref 6–20)
CALCIUM SERPL-MCNC: 9.6 MG/DL (ref 8.6–10.4)
CHLORIDE SERPL-SCNC: 103 MMOL/L (ref 98–107)
CO2 SERPL-SCNC: 21 MMOL/L (ref 20–31)
CREAT SERPL-MCNC: 0.6 MG/DL (ref 0.5–0.9)
GFR, ESTIMATED: >90 ML/MIN/1.73M2
GLUCOSE SERPL-MCNC: 83 MG/DL (ref 74–99)
HCV AB SERPL QL IA: NONREACTIVE
HIV 1+2 AB+HIV1 P24 AG SERPL QL IA: NONREACTIVE
POTASSIUM SERPL-SCNC: 4.4 MMOL/L (ref 3.7–5.3)
PROT SERPL-MCNC: 7.3 G/DL (ref 6.6–8.7)
SODIUM SERPL-SCNC: 139 MMOL/L (ref 136–145)

## 2024-08-06 LAB
BASOPHILS # BLD: 0.04 K/UL (ref 0–0.2)
BASOPHILS NFR BLD: 0 % (ref 0–2)
EOSINOPHIL # BLD: 0.08 K/UL (ref 0–0.44)
EOSINOPHILS RELATIVE PERCENT: 1 % (ref 1–4)
ERYTHROCYTE [DISTWIDTH] IN BLOOD BY AUTOMATED COUNT: 13.7 % (ref 11.8–14.4)
HBV SURFACE AG SERPL QL IA: NONREACTIVE
HCT VFR BLD AUTO: 44.6 % (ref 36.3–47.1)
HGB BLD-MCNC: 14.4 G/DL (ref 11.9–15.1)
IMM GRANULOCYTES # BLD AUTO: 0.03 K/UL (ref 0–0.3)
IMM GRANULOCYTES NFR BLD: 0 %
LYMPHOCYTES NFR BLD: 1.58 K/UL (ref 1.1–3.7)
LYMPHOCYTES RELATIVE PERCENT: 17 % (ref 24–43)
MCH RBC QN AUTO: 29 PG (ref 25.2–33.5)
MCHC RBC AUTO-ENTMCNC: 32.3 G/DL (ref 28.4–34.8)
MCV RBC AUTO: 89.9 FL (ref 82.6–102.9)
MONOCYTES NFR BLD: 0.6 K/UL (ref 0.1–1.2)
MONOCYTES NFR BLD: 6 % (ref 3–12)
NEUTROPHILS NFR BLD: 76 % (ref 36–65)
NEUTS SEG NFR BLD: 7.09 K/UL (ref 1.5–8.1)
NRBC BLD-RTO: 0 PER 100 WBC
PMV BLD AUTO: 9.7 FL (ref 8.1–13.5)
RBC # BLD AUTO: 4.96 M/UL (ref 3.95–5.11)
RUBV IGG SERPL QL IA: 221 IU/ML
T PALLIDUM AB SER QL IA: NONREACTIVE
WBC OTHER # BLD: 9.4 K/UL (ref 3.5–11.3)

## 2024-08-07 LAB — VZV IGG SER QL IA: 3.43

## 2024-08-21 ENCOUNTER — ROUTINE PRENATAL (OUTPATIENT)
Dept: OBGYN CLINIC | Age: 29
End: 2024-08-21

## 2024-08-21 VITALS
SYSTOLIC BLOOD PRESSURE: 102 MMHG | BODY MASS INDEX: 20.92 KG/M2 | HEIGHT: 68 IN | WEIGHT: 138 LBS | DIASTOLIC BLOOD PRESSURE: 64 MMHG

## 2024-08-21 DIAGNOSIS — Z87.59 HISTORY OF GESTATIONAL HYPERTENSION: ICD-10-CM

## 2024-08-21 DIAGNOSIS — Z3A.12 12 WEEKS GESTATION OF PREGNANCY: ICD-10-CM

## 2024-08-21 DIAGNOSIS — Z34.90 NORMAL PREGNANCY, ANTEPARTUM: Primary | ICD-10-CM

## 2024-08-21 NOTE — PROGRESS NOTES
Presents for OB visit  Gestation 12w2d  Estimated Date of Delivery: 3/3/25    Insurance: [facility] [place]    IPV bag/mug given:  Genetic Information given/reviewed: 2024 LEWIS Lopez CNM   1st trimester education packet given: 2024 LEWIS Lopez CNM   2nd trimester education packet given:  3rd trimester education packet given:      FOB Name:    LMP approximate/unknown recommend dating off of USN 2024 LEWIS Lopez CNM     Last Pap Smear: 2022 Normal   [] Urine collected for culture   [] Negative date **   [] Positive date **  [x] UDS collected 2024 Roseline Moran MA  [x] Gc/ct collected 2024 Roseline Moran MA  [] Prenatal Labs completed **    Genetic Screening:  [x]Accepted NIPS 2024 LEWIS Lopez CNM   [] Undecided   []Declined   []Completed  [] Low risk   [] Abn for **    Carrier Screening:  [x] Known negative CF/SMA/Fragile X in media       Baby aspirin screen:  [x]Positive (hx ghtn)  []Negative    Early 1 hour GTT:  []Yes  [x] No    AFP:  []Ordered  []Completed    Anatomy scan:  []Referral Sent  []Scheduled   []Completed  [] Follow up **    Fetal Echo:   [] Yes  [] No    High Risk Factors:  Hx gHTN  - cmp/cbc/pc ratio at IPV  Short Interval Pregnancy     []Placed on High Risk List    Fetal Surveillance:  [] Yes  [] No    Covid Vaccine:  Flu Vaccine:  []Given **  [] Declined **  Tdap:  []Given **  [] Declined **  Rhogam: A POSITIVE   [x] Not indicated     1hr GTT:  [] Results **  3hr GTT:    []Breast Pump Order Signed/Sent    36 weeks US:  []Completed     GBS:  [] Positive   [] Negative from **    Apts with :  [] Date: ** Gestational Age: **  [] Date: ** Gestational Age: **    Apts with :  [] Date: ** Gestational Age: **  [] Date: ** Gestational Age: **    Depression/Anxiety screening (date/results/sign off)  1st trimester  *EPDS score:  *GAD7 score:  *MOOD score:    2nd trimester  *EPDS

## 2024-08-22 ENCOUNTER — OFFICE VISIT (OUTPATIENT)
Age: 29
End: 2024-08-22
Payer: COMMERCIAL

## 2024-08-22 VITALS
HEART RATE: 74 BPM | WEIGHT: 138 LBS | SYSTOLIC BLOOD PRESSURE: 109 MMHG | HEIGHT: 68 IN | DIASTOLIC BLOOD PRESSURE: 77 MMHG | BODY MASS INDEX: 20.92 KG/M2 | OXYGEN SATURATION: 98 % | RESPIRATION RATE: 16 BRPM

## 2024-08-22 DIAGNOSIS — L72.0 EPIDERMAL CYST OF NECK: Primary | ICD-10-CM

## 2024-08-22 DIAGNOSIS — Z98.890 STATUS POST SURGERY: ICD-10-CM

## 2024-08-22 PROCEDURE — 99213 OFFICE O/P EST LOW 20 MIN: CPT | Performed by: NURSE PRACTITIONER

## 2024-08-22 ASSESSMENT — ENCOUNTER SYMPTOMS
NAUSEA: 0
SHORTNESS OF BREATH: 0
ROS SKIN COMMENTS: SEE HPI.
RHINORRHEA: 0
ABDOMINAL PAIN: 0
VOMITING: 0
COUGH: 0

## 2024-08-22 NOTE — PROGRESS NOTES
[facility] General Surgery   Jamel Sutton MD, FACS  Trista Estrada, APRN-CNP  [address], [address]  [place] [address]  P: [phone], F: [phone]    General and Robotic Surgery  Post-Op Visit Note               PATIENT NAME: Maureen Acuna   :  1995   MRN: [id number]   PCP:  Tad Atkins PA     TODAY'S DATE: 2024    Chief Complaint   Patient presents with    Post-Op Check     PO EXCISION CYST  surgical path       HISTORY OF PRESENT ILLNESS: 28 y.o. female status post Excision symptomatic epidermal inclusion cyst 2 x 1.5 x 1 cm left posterior neck 24.    Patient denies any incisional issues.  Denies any known erythema.  Denies drainage from incision.  Denies significant swelling.  Pain is controlled.  Completed postop antibiotics.    Patient is pregnant.    PAST MEDICAL HISTORY     Past Medical History:   Diagnosis Date    Migraines        PROBLEM LIST     Patient Active Problem List   Diagnosis    Acne    Astigmatism    Heart Murmur    Hx Migraines     23 M Apg  Wt 8#3    gHTN (G1)    Epidermal cyst of neck       SURGICAL HISTORY       Past Surgical History:   Procedure Laterality Date    NECK SURGERY N/A 2024    EXCISION EPIDERMOID CYST LEFT POSTERIOR NECK performed by Jamel Sutton MD at [facility] OR    WISDOM TOOTH EXTRACTION         MEDICATIONS     Current Outpatient Medications:     cephALEXin (KEFLEX) 500 MG capsule, 500 mgTake three times daily, Disp: 21 capsule, Rfl: 0    aspirin 81 MG EC tablet, Take 1 tablet by mouth daily, Disp: 90 tablet, Rfl: 1    Prenatal MV-Min-Fe Fum-FA-DHA (PRENATAL 1 PO), Take 1 tablet by mouth daily, Disp: , Rfl:      VITALS:  /77   Pulse 74   Resp 16   Ht 1.727 m (5' 8\")   Wt 62.6 kg (138 lb)   LMP 2024 (Approximate)   SpO2 98% Comment: room air at rest  BMI 20.98 kg/m²     Review of Systems   Constitutional:  Negative for chills and fever.   HENT:  Negative for congestion and

## 2024-08-26 ENCOUNTER — HOSPITAL ENCOUNTER (OUTPATIENT)
Age: 29
Setting detail: SPECIMEN
Discharge: HOME | End: 2024-08-26

## 2024-08-26 DIAGNOSIS — Z3A.01 7 WEEKS GESTATION OF PREGNANCY: ICD-10-CM

## 2024-08-26 LAB
CREAT UR-MCNC: 70.6 MG/DL (ref 28–217)
TOTAL PROTEIN, URINE: 14 MG/DL
URINE TOTAL PROTEIN CREATININE RATIO: 0.2

## 2024-09-16 ENCOUNTER — ROUTINE PRENATAL (OUTPATIENT)
Dept: OBGYN CLINIC | Age: 29
End: 2024-09-16

## 2024-09-16 VITALS
BODY MASS INDEX: 21.52 KG/M2 | SYSTOLIC BLOOD PRESSURE: 116 MMHG | DIASTOLIC BLOOD PRESSURE: 62 MMHG | HEIGHT: 68 IN | WEIGHT: 142 LBS

## 2024-09-16 DIAGNOSIS — Z87.59 HISTORY OF GESTATIONAL HYPERTENSION: ICD-10-CM

## 2024-09-16 DIAGNOSIS — Z34.90 NORMAL PREGNANCY, ANTEPARTUM: Primary | ICD-10-CM

## 2024-09-16 DIAGNOSIS — Z3A.16 16 WEEKS GESTATION OF PREGNANCY: ICD-10-CM

## 2024-09-16 PROCEDURE — 0502F SUBSEQUENT PRENATAL CARE: CPT | Performed by: NURSE PRACTITIONER

## 2024-09-25 LAB
Lab: NORMAL
NTRA 22Q11.2 DELETION SYNDROME POPULATION-BASED RISK TEXT: NORMAL
NTRA 22Q11.2 DELETION SYNDROME RESULT TEXT: NORMAL
NTRA 22Q11.2 DELETION SYNDROME RISK SCORE TEXT: NORMAL
NTRA FETAL FRACTION: NORMAL
NTRA FETAL RHD SUMMARY: NORMAL
NTRA GENDER OF FETUS: NORMAL
NTRA MONOSOMY X AGE-BASED RISK TEXT: NORMAL
NTRA MONOSOMY X RESULT TEXT: NORMAL
NTRA MONOSOMY X RISK SCORE TEXT: NORMAL
NTRA TRIPLOIDY RESULT TEXT: NORMAL
NTRA TRISOMY 13 AGE-BASED RISK TEXT: NORMAL
NTRA TRISOMY 13 RESULT TEXT: NORMAL
NTRA TRISOMY 13 RISK SCORE TEXT: NORMAL
NTRA TRISOMY 18 AGE-BASED RISK TEXT: NORMAL
NTRA TRISOMY 18 RESULT TEXT: NORMAL
NTRA TRISOMY 18 RISK SCORE TEXT: NORMAL
NTRA TRISOMY 21 AGE-BASED RISK TEXT: NORMAL
NTRA TRISOMY 21 RESULT TEXT: NORMAL
NTRA TRISOMY 21 RISK SCORE TEXT: NORMAL

## 2024-10-06 ENCOUNTER — PATIENT MESSAGE (OUTPATIENT)
Dept: OBGYN CLINIC | Age: 29
End: 2024-10-06

## 2024-10-16 ENCOUNTER — ROUTINE PRENATAL (OUTPATIENT)
Dept: OBGYN CLINIC | Age: 29
End: 2024-10-16

## 2024-10-16 VITALS — BODY MASS INDEX: 22.05 KG/M2 | SYSTOLIC BLOOD PRESSURE: 118 MMHG | DIASTOLIC BLOOD PRESSURE: 60 MMHG | WEIGHT: 145 LBS

## 2024-10-16 DIAGNOSIS — Z34.90 NORMAL PREGNANCY, ANTEPARTUM: Primary | ICD-10-CM

## 2024-10-16 DIAGNOSIS — Z3A.20 20 WEEKS GESTATION OF PREGNANCY: ICD-10-CM

## 2024-10-16 PROCEDURE — 0502F SUBSEQUENT PRENATAL CARE: CPT | Performed by: OBSTETRICS & GYNECOLOGY

## 2024-10-17 NOTE — PROGRESS NOTES
Maureen ErnandezRosenda is a 29 y.o. female 20w3d        OB History    Para Term  AB Living   2 1 1     1   SAB IAB Ectopic Molar Multiple Live Births           0 1      # Outcome Date GA Lbr Alejandro/2nd Weight Sex Type Anes PTL Lv   2 Current            1 Term 23 40w1d / 01:51 3.735 kg (8 lb 3.8 oz) M Vag-Spont EPI N KLAUS       Vitals  BP: 118/60  Weight - Scale: 65.8 kg (145 lb)  Fundal Height (cm): 20 cm  Fetal HR: 145  Movement: Present    The patient was seen and evaluated. There was positive fetal movements. No contractions or leakage of fluid. Signs and symptoms of  labor as well as labor were reviewed.  NIPT completed and low risk.  Carrier screening completed and negative.   A single marker MSAFP was reviewed and counseled, patient declined. The patients anatomy ultrasound has been completed and reviewed with patient.  A 28 week lab panel was ordered. This includes a (HH, 1 hr GTT, U/A C&S). The patient is to complete this in the next four to eight weeks.          The S/S of Pre-Eclampsia were reviewed with the patient in detail. She is to report any of these if they occur. She currently denies any of these.    The patient is RH positive Rhogam Ordered no    The patient was instructed on fetal kick counts and was given a kick sheet to complete every 8 hours. This is to begin at 28 weeks gestation. She was instructed that the baby should move at a minimum of ten times within one hour after a meal. The patient was instructed to lay down on her left side twenty minutes after eating and count movements for up to one hour with a target value of ten movements.  She was instructed to notify the office if she did not make that target after two attempts or if after any attempt there was less than four movements.      Insurance: [employer] [place]    IPV bag/mug given:  Genetic Information given/reviewed: 2024 LEWIS Lopez CNM   1st trimester education packet

## 2024-10-22 ENCOUNTER — ROUTINE PRENATAL (OUTPATIENT)
Dept: PERINATAL CARE | Age: 29
End: 2024-10-22
Payer: COMMERCIAL

## 2024-10-22 VITALS
SYSTOLIC BLOOD PRESSURE: 109 MMHG | WEIGHT: 147 LBS | DIASTOLIC BLOOD PRESSURE: 68 MMHG | TEMPERATURE: 98 F | RESPIRATION RATE: 16 BRPM | HEART RATE: 99 BPM | BODY MASS INDEX: 23.07 KG/M2 | HEIGHT: 67 IN

## 2024-10-22 DIAGNOSIS — O09.899 SHORT INTERVAL BETWEEN PREGNANCIES COMPLICATING PREGNANCY, ANTEPARTUM: ICD-10-CM

## 2024-10-22 DIAGNOSIS — Z87.59 HISTORY OF GESTATIONAL HYPERTENSION: ICD-10-CM

## 2024-10-22 DIAGNOSIS — Z36.86 ENCOUNTER FOR SCREENING FOR RISK OF PRE-TERM LABOR: ICD-10-CM

## 2024-10-22 DIAGNOSIS — Z3A.21 21 WEEKS GESTATION OF PREGNANCY: ICD-10-CM

## 2024-10-22 DIAGNOSIS — O43.112 CIRCUMVALLATE PLACENTA IN SECOND TRIMESTER: Primary | ICD-10-CM

## 2024-10-22 PROCEDURE — 76817 TRANSVAGINAL US OBSTETRIC: CPT | Performed by: OBSTETRICS & GYNECOLOGY

## 2024-10-22 PROCEDURE — 76811 OB US DETAILED SNGL FETUS: CPT | Performed by: OBSTETRICS & GYNECOLOGY

## 2024-10-22 PROCEDURE — 99203 OFFICE O/P NEW LOW 30 MIN: CPT | Performed by: OBSTETRICS & GYNECOLOGY

## 2024-10-22 NOTE — PROGRESS NOTES
Maureen Acuna, was evaluated through a synchronous (real-time) audio-video encounter. The patient (or guardian if applicable) is aware that this is a billable service, which includes applicable co-pays. This Virtual Visit was conducted with patient's (and/or legal guardian's) consent. Patient identification was verified, and a caregiver was present when appropriate.   [facility] [address] [place] [id number]  Provider was located at Home (Appt Dept State): [place].  Confirm you are appropriately licensed, registered, or certified to deliver care in the state where the patient is located as indicated above. If you are not or unsure, please re-schedule the visit: Yes, I confirm.      Total time spent for this encounter:  approximately 30 minutes (see dictation).    --Michael Vo MD on 10/22/2024 at 11:52 AM    An electronic signature was used to authenticate this note.

## 2024-10-24 ENCOUNTER — TELEPHONE (OUTPATIENT)
Dept: OBGYN CLINIC | Age: 29
End: 2024-10-24

## 2024-10-24 NOTE — TELEPHONE ENCOUNTER
Patient had an anatomy scan and then virtual visit with Dr. Vo but still has questions. Tried to set up virtual visit but no availability until mid November. Please advise

## 2024-10-29 ENCOUNTER — TELEMEDICINE (OUTPATIENT)
Dept: OBGYN CLINIC | Age: 29
End: 2024-10-29

## 2024-10-29 DIAGNOSIS — O43.102 PLACENTAL ABNORMALITY IN SECOND TRIMESTER: ICD-10-CM

## 2024-10-29 DIAGNOSIS — Z3A.22 22 WEEKS GESTATION OF PREGNANCY: Primary | ICD-10-CM

## 2024-11-14 ENCOUNTER — ROUTINE PRENATAL (OUTPATIENT)
Dept: OBGYN CLINIC | Age: 29
End: 2024-11-14

## 2024-11-14 VITALS
HEIGHT: 67 IN | SYSTOLIC BLOOD PRESSURE: 108 MMHG | DIASTOLIC BLOOD PRESSURE: 74 MMHG | WEIGHT: 146 LBS | BODY MASS INDEX: 22.91 KG/M2

## 2024-11-14 DIAGNOSIS — Z3A.24 24 WEEKS GESTATION OF PREGNANCY: ICD-10-CM

## 2024-11-14 DIAGNOSIS — O43.112 CIRCUMVALLATE PLACENTA IN SECOND TRIMESTER: ICD-10-CM

## 2024-11-14 DIAGNOSIS — Z87.59 HISTORY OF GESTATIONAL HYPERTENSION: ICD-10-CM

## 2024-11-14 DIAGNOSIS — Z34.90 NORMAL PREGNANCY, ANTEPARTUM: Primary | ICD-10-CM

## 2024-11-14 PROCEDURE — 0502F SUBSEQUENT PRENATAL CARE: CPT | Performed by: ADVANCED PRACTICE MIDWIFE

## 2024-11-14 NOTE — PROGRESS NOTES
12/12/2024) for Return OB.    The patient, Maureen Acuna was seen for 25 minutes were spent on this encounter on the date of service by the provider.         Electronically Signed By: LEWIS Lopez CNM

## 2024-12-12 ENCOUNTER — ROUTINE PRENATAL (OUTPATIENT)
Dept: PERINATAL CARE | Age: 29
End: 2024-12-12
Payer: COMMERCIAL

## 2024-12-12 VITALS
BODY MASS INDEX: 25.74 KG/M2 | SYSTOLIC BLOOD PRESSURE: 124 MMHG | RESPIRATION RATE: 16 BRPM | HEIGHT: 67 IN | WEIGHT: 164 LBS | TEMPERATURE: 98.2 F | HEART RATE: 100 BPM | DIASTOLIC BLOOD PRESSURE: 68 MMHG

## 2024-12-12 DIAGNOSIS — Z3A.28 28 WEEKS GESTATION OF PREGNANCY: ICD-10-CM

## 2024-12-12 DIAGNOSIS — Z87.59 HISTORY OF GESTATIONAL HYPERTENSION: ICD-10-CM

## 2024-12-12 DIAGNOSIS — Z36.4 ULTRASOUND FOR ANTENATAL SCREENING FOR FETAL GROWTH RESTRICTION: ICD-10-CM

## 2024-12-12 DIAGNOSIS — O09.899 SHORT INTERVAL BETWEEN PREGNANCIES COMPLICATING PREGNANCY, ANTEPARTUM: ICD-10-CM

## 2024-12-12 DIAGNOSIS — O43.113 CIRCUMVALLATE PLACENTA IN THIRD TRIMESTER: Primary | ICD-10-CM

## 2024-12-12 PROCEDURE — 76816 OB US FOLLOW-UP PER FETUS: CPT | Performed by: OBSTETRICS & GYNECOLOGY

## 2024-12-12 PROCEDURE — 76819 FETAL BIOPHYS PROFIL W/O NST: CPT | Performed by: OBSTETRICS & GYNECOLOGY

## 2024-12-13 ENCOUNTER — ROUTINE PRENATAL (OUTPATIENT)
Dept: OBGYN CLINIC | Age: 29
End: 2024-12-13

## 2024-12-13 VITALS — SYSTOLIC BLOOD PRESSURE: 116 MMHG | DIASTOLIC BLOOD PRESSURE: 62 MMHG | WEIGHT: 164 LBS | BODY MASS INDEX: 25.69 KG/M2

## 2024-12-13 DIAGNOSIS — Z3A.28 28 WEEKS GESTATION OF PREGNANCY: Primary | ICD-10-CM

## 2024-12-13 DIAGNOSIS — O43.112 CIRCUMVALLATE PLACENTA IN SECOND TRIMESTER: ICD-10-CM

## 2024-12-13 NOTE — PROGRESS NOTES
spent for the prenatal visit)  None                                              Assessment:  1. Maureen Acuna is a 29 y.o. female  2.   3. 28w4d    Patient Active Problem List    Diagnosis Date Noted     23 M Apg 8/9 Wt 8#3 2023     Priority: Medium    gHTN (G1) 2023     Priority: Medium    Hx Migraines 2022     Priority: Medium    Astigmatism 10/04/2017     Priority: Medium    Acne 2014     Priority: Medium    Heart Murmur 2014     Priority: Medium    Epidermal cyst of neck 2024        Diagnosis Orders   1. 28 weeks gestation of pregnancy        2. Circumvallate placenta in second trimester                  Plan:  The patient will return to the office for her next visit in 3 weeks. See antepartum flow sheet.   Counseled on s/s of preeclampsia.  Counseled on s/s of  labor.  Counseled on fetal movement  Repeat ultrasound 34w discussed      Testing Indicated: No  Scheduled with Nursing-Pt notified: No    The patient, Maureen Acuna is a 29 y.o. female, was seen with a total time spent of 20 minutes for the visit on this date of service by the E/M provider Excluding any time spent for  testing. The time component had both face to face and non face to face time spent in determining the total time component.  Counseling and education regarding her diagnosis listed below and her options regarding those diagnoses were also included in determining her time component.      Diagnosis Orders   1. 28 weeks gestation of pregnancy        2. Circumvallate placenta in second trimester             The patient had her preventative health maintenance recommendations and follow-up reviewed with her at the completion of her visit.

## 2024-12-23 ENCOUNTER — HOSPITAL ENCOUNTER (OUTPATIENT)
Age: 29
Setting detail: SPECIMEN
Discharge: HOME | End: 2024-12-23

## 2024-12-23 DIAGNOSIS — Z34.90 NORMAL PREGNANCY, ANTEPARTUM: ICD-10-CM

## 2024-12-23 LAB
BASOPHILS # BLD: 0.05 K/UL (ref 0–0.2)
BASOPHILS NFR BLD: 1 % (ref 0–2)
EOSINOPHIL # BLD: 0.1 K/UL (ref 0–0.44)
EOSINOPHILS RELATIVE PERCENT: 1 % (ref 1–4)
ERYTHROCYTE [DISTWIDTH] IN BLOOD BY AUTOMATED COUNT: 13.2 % (ref 11.8–14.4)
GLUCOSE 1H P 50 G GLC PO SERPL-MCNC: 104 MG/DL (ref 70–135)
GLUCOSE ADMINISTRATION: NORMAL
HCT VFR BLD AUTO: 40.8 % (ref 36.3–47.1)
HGB BLD-MCNC: 13.1 G/DL (ref 11.9–15.1)
IMM GRANULOCYTES # BLD AUTO: 0.09 K/UL (ref 0–0.3)
IMM GRANULOCYTES NFR BLD: 1 %
LYMPHOCYTES NFR BLD: 1.45 K/UL (ref 1.1–3.7)
LYMPHOCYTES RELATIVE PERCENT: 15 % (ref 24–43)
MCH RBC QN AUTO: 30 PG (ref 25.2–33.5)
MCHC RBC AUTO-ENTMCNC: 32.1 G/DL (ref 28.4–34.8)
MCV RBC AUTO: 93.6 FL (ref 82.6–102.9)
MONOCYTES NFR BLD: 0.77 K/UL (ref 0.1–1.2)
MONOCYTES NFR BLD: 8 % (ref 3–12)
NEUTROPHILS NFR BLD: 74 % (ref 36–65)
NEUTS SEG NFR BLD: 7.56 K/UL (ref 1.5–8.1)
NRBC BLD-RTO: 0 PER 100 WBC
PLATELET # BLD AUTO: 289 K/UL (ref 138–453)
PMV BLD AUTO: 9.4 FL (ref 8.1–13.5)
RBC # BLD AUTO: 4.36 M/UL (ref 3.95–5.11)
WBC OTHER # BLD: 10 K/UL (ref 3.5–11.3)

## 2024-12-24 LAB
MICROORGANISM SPEC CULT: NORMAL
SERVICE CMNT-IMP: NORMAL
SPECIMEN DESCRIPTION: NORMAL

## 2025-01-13 ENCOUNTER — ROUTINE PRENATAL (OUTPATIENT)
Dept: OBGYN CLINIC | Age: 30
End: 2025-01-13

## 2025-01-13 VITALS
WEIGHT: 171.8 LBS | BODY MASS INDEX: 26.04 KG/M2 | HEIGHT: 68 IN | SYSTOLIC BLOOD PRESSURE: 112 MMHG | DIASTOLIC BLOOD PRESSURE: 72 MMHG

## 2025-01-13 DIAGNOSIS — Z34.90 NORMAL PREGNANCY, ANTEPARTUM: Primary | ICD-10-CM

## 2025-01-13 DIAGNOSIS — O43.103 PLACENTA, ABNORMAL, THIRD TRIMESTER: ICD-10-CM

## 2025-01-13 DIAGNOSIS — Z87.59 HISTORY OF GESTATIONAL HYPERTENSION: ICD-10-CM

## 2025-01-13 DIAGNOSIS — Z3A.33 33 WEEKS GESTATION OF PREGNANCY: ICD-10-CM

## 2025-01-13 PROCEDURE — 0502F SUBSEQUENT PRENATAL CARE: CPT | Performed by: NURSE PRACTITIONER

## 2025-01-13 SDOH — ECONOMIC STABILITY: FOOD INSECURITY: WITHIN THE PAST 12 MONTHS, YOU WORRIED THAT YOUR FOOD WOULD RUN OUT BEFORE YOU GOT MONEY TO BUY MORE.: PATIENT DECLINED

## 2025-01-13 SDOH — ECONOMIC STABILITY: FOOD INSECURITY: WITHIN THE PAST 12 MONTHS, THE FOOD YOU BOUGHT JUST DIDN'T LAST AND YOU DIDN'T HAVE MONEY TO GET MORE.: PATIENT DECLINED

## 2025-01-13 ASSESSMENT — PATIENT HEALTH QUESTIONNAIRE - PHQ9: DEPRESSION UNABLE TO ASSESS: PT REFUSES

## 2025-01-13 NOTE — PATIENT INSTRUCTIONS
After Hours Number: You can call the office [phone]  or [phone]  Call if you have:  1.  Bleeding like a period  2.  Cramps or contractions greater than 2 hours  3.  If you are leaking fluid  4.  If you've a fever greater than 100°  5.  If you feel as if baby is not moving  6. If you have continuous vomiting over 3-4 hours   Counting Your Baby's Kicks: Care Instructions  Your Care Instructions    Counting your baby's kicks is one way your doctor can tell that your baby is healthy. Most women--especially in a first pregnancy--feel their baby move for the first time between 16 and 22 weeks. The movement may feel like flutters rather than kicks. Your baby may move more at certain times of the day. When you are active, you may notice less kicking than when you are resting. At your prenatal visits, your doctor will ask whether the baby is active.  In your last trimester, your doctor may ask you to count the number of times you feel your baby move.  Follow-up care is a key part of your treatment and safety. Be sure to make and go to all appointments, and call your doctor if you are having problems. It's also a good idea to know your test results and keep a list of the medicines you take.  How do you count fetal kicks?  A common method of checking your baby's movement is to count the number of kicks or moves you feel in 1 hour. Ten movements (such as kicks, flutters, or rolls) in 1 hour are normal. Some doctors suggest that you count in the morning until you get to 10 movements. Then you can quit for that day and start again the next day.  Pick your baby's most active time of day to count. This may be any time from morning to evening.  If you do not feel 10 movements in an hour, your baby may be sleeping. Wait for the next hour and count again.  When should you call for help?  Call your doctor now or seek immediate medical care if:    You noticed that your baby has stopped moving or is moving much less than

## 2025-01-13 NOTE — PROGRESS NOTES
kick sheet to complete every 8 hours. This is to begin at 28 weeks gestation. She was instructed that the baby should move at a minimum of ten times within one hour after a meal. The patient was instructed to lay down on her left side twenty minutes after eating and count movements for up to one hour with a target value of ten movements.   She was instructed to notify the office if she did not make that target after two attempts or if after any attempt there was less than four movements.  After hour numbers reviewed , along with labor signs if indicated.   Contractions/cramping between 5-7 minutes and persisting even with attempts of increased water and laying on side.   Fluid leakage, bleeding  NST information given no  The patient was counseled on Labor & Delivery.   Route of delivery and counseling on vaginal, operative vaginal, and  sections were completed with the risks of each to both the patient as well as her baby. The possibility of a blood transfusion was discussed as well. The patient was not opposed to receiving a transfusion if needed. The patient was counseled on types of analgesia during labor and is considering either Regional or IV medication the risks, benefits and alternatives were discussed.   The patient was counseled on the mandatory call ahead policy. She has been instructed to call the office at anytime prior to going into the hospital so the on-call physician may direct her to the appropriate facility for care. Exceptions were reviewed including but not limited to: Decreased fetal movement, vaginal Bleeding or hemorrhage, trauma, readily expectant delivery, or any instance where she feels 911 should be utilized.      Of the 20 minute duration appointment visit, Heather Rainey CNP spent at least 50% of the face-to-face time in counseling, explanation of diagnosis, planning of further management, and answering all questions.

## 2025-01-23 NOTE — PROGRESS NOTES
Prenatal Visit    Maureen Acuna is a 29 y.o. female  at 34w3d    Positive fetal movement  Negative contractions, vaginal bleeding, loss of fluid    Discussed resident involvement in triage and labor and delivery process. Discussed call group. All questions answered. Patient verbalized understanding and agreement.     Vitals:  /62   Wt 80.5 kg (177 lb 8 oz)   LMP 2024 (Approximate)   BMI 26.99 kg/m²       Assessment & Plan:  Maureen Acuna is a 29 y.o. female  at 34w3d   - 28 week labs completed   - Influenza and Covid vaccinations recommended per ACOG: R/B/A discussed with increased risk of both maternal and fetal morbidity and mortality in unvaccinated pregnant patients.    - TDAP and RSV vaccinations recommended per ACOG. R/B/A discussed. She understands must received RSV vaccine at pharmacy.   - Continue prenatal vitamin   - tdap today   - discussed rsv          LMP approximate/unknown recommend dating off of USN 2024 LEWIS Lopez CNM     Last Pap Smear: 2022 Normal   [x] Urine collected for culture   [x] Negative    [] Positive   [x] UDS collected 2024 Roseline Moran MA  [x] Gc/ct collected 2024 Roseline Moran MA  [x] Prenatal Labs completed     Genetic Screening:  [x]Accepted NIPS 2024 LEWIS Lopez CNM   [x]Completed  [x] Low risk     Carrier Screening:  [x] Known negative CF/SMA/Fragile X in media       Baby aspirin screen:  [x]Positive (hx ghtn)  []Negative    Early 1 hour GTT:  []Yes  [x] No    AFP: Declined on 2024   []Ordered  []Completed    Anatomy scan:  [x]Referral Sent  [x]Scheduled -10/22/24  [x]Completed  [x] Follow up 34 weeks    Fetal Echo:   [] Yes  [x] No    High Risk Factors:  Hx gHTN  - cmp/cbc/pc ratio at IPV  Short Interval Pregnancy     []Placed on High Risk List    Fetal Surveillance:  [] Yes  [x] No    Covid Vaccine:  Counseled on RSV vaccine 2025   Flu

## 2025-01-24 ENCOUNTER — ROUTINE PRENATAL (OUTPATIENT)
Dept: OBGYN CLINIC | Age: 30
End: 2025-01-24
Payer: COMMERCIAL

## 2025-01-24 VITALS — DIASTOLIC BLOOD PRESSURE: 62 MMHG | SYSTOLIC BLOOD PRESSURE: 118 MMHG | BODY MASS INDEX: 26.99 KG/M2 | WEIGHT: 177.5 LBS

## 2025-01-24 DIAGNOSIS — Z34.93 ENCOUNTER FOR SUPERVISION OF LOW-RISK PREGNANCY IN THIRD TRIMESTER: ICD-10-CM

## 2025-01-24 DIAGNOSIS — Z23 NEED FOR TDAP VACCINATION: ICD-10-CM

## 2025-01-24 DIAGNOSIS — Z3A.34 34 WEEKS GESTATION OF PREGNANCY: Primary | ICD-10-CM

## 2025-01-24 PROCEDURE — 0502F SUBSEQUENT PRENATAL CARE: CPT | Performed by: STUDENT IN AN ORGANIZED HEALTH CARE EDUCATION/TRAINING PROGRAM

## 2025-01-24 PROCEDURE — 90471 IMMUNIZATION ADMIN: CPT | Performed by: STUDENT IN AN ORGANIZED HEALTH CARE EDUCATION/TRAINING PROGRAM

## 2025-01-24 PROCEDURE — 90715 TDAP VACCINE 7 YRS/> IM: CPT | Performed by: STUDENT IN AN ORGANIZED HEALTH CARE EDUCATION/TRAINING PROGRAM

## 2025-01-24 NOTE — PROGRESS NOTES
The patient requested to have the TDAP vaccine. Consent obtained. Injection of 0.5mL given Left deltoid Intramuscular.  Lot #[id number]:  EXP:3/22/27  Patient tolerated well.

## 2025-01-28 ENCOUNTER — ROUTINE PRENATAL (OUTPATIENT)
Dept: PERINATAL CARE | Age: 30
End: 2025-01-28
Payer: COMMERCIAL

## 2025-01-28 VITALS
DIASTOLIC BLOOD PRESSURE: 70 MMHG | SYSTOLIC BLOOD PRESSURE: 110 MMHG | HEART RATE: 80 BPM | RESPIRATION RATE: 16 BRPM | HEIGHT: 67 IN | WEIGHT: 177 LBS | BODY MASS INDEX: 27.78 KG/M2 | TEMPERATURE: 98.3 F

## 2025-01-28 DIAGNOSIS — Z3A.35 35 WEEKS GESTATION OF PREGNANCY: Primary | ICD-10-CM

## 2025-01-28 DIAGNOSIS — O43.113 CIRCUMVALLATE PLACENTA IN THIRD TRIMESTER: ICD-10-CM

## 2025-01-28 PROCEDURE — 76805 OB US >/= 14 WKS SNGL FETUS: CPT | Performed by: OBSTETRICS & GYNECOLOGY

## 2025-01-28 PROCEDURE — 99999 PR OFFICE/OUTPT VISIT,PROCEDURE ONLY: CPT | Performed by: OBSTETRICS & GYNECOLOGY

## 2025-01-28 PROCEDURE — 76819 FETAL BIOPHYS PROFIL W/O NST: CPT | Performed by: OBSTETRICS & GYNECOLOGY

## 2025-01-28 NOTE — PROGRESS NOTES
Please refer to attached ultrasound report for doctor's evaluation of the clinical information obtained by vital signs, ultrasound, and/or non-stress test along with management recommendation.  
Problems Maternal Aunt     No Known Problems Maternal Uncle     Skin Cancer Paternal Aunt     No Known Problems Paternal Uncle     No Known Problems Maternal Cousin     No Known Problems Paternal Cousin     No Known Problems Other           PHYSICAL EXAMINATION:  /70   Pulse 80   Temp 98.3 °F (36.8 °C)   Resp 16   Ht 1.702 m (5' 7\")   Wt 80.3 kg (177 lb)   LMP 05/18/2024 (Approximate)   Body mass index is 27.72 kg/m².    Urine dipstick:  No results found for this visit on 01/28/25.     A/an Growth  ultrasound was done in our office today. Please refer to the enclosed copy of the ultrasound report for further information.    Discussion:  1. Ordonez fetus in a cephalic presentation. Fetal motion and cardiac motion is seen and appears normal.  2. The baby is appropriate for gestational age and is consistent with previous ultrasound dating, however, the HC is at the 7%.  3. No obvious anomalies are noted. We were not able to complete the repeat anatomical survey today due to advanced gestational age.  4. The placenta is anterior. The amniotic fluid is normal.  5. Biophysical profile is 8/8.  6. Circumvallate placentation is again observed.   7. Multiple placental lakes are again noted.     IMPRESSION:  1. Single intrauterine gestation at 35+ weeks with appropriate growth and circumvallate placenta.  2.  No obvious fetal anomalies are noted.  The fetus is in a vertex presentation.  3.  The amniotic fluid volume is normal and the placenta is anterior.    RECOMMENDATIONS:  Each of the recommendations were discussed with the patient:  1.  Consider delivery at 39 weeks gestation.  2.  Follow-up would be otherwise as clinically indicated.    The patient is to continue to follow with you in your office for ongoing obstetric care.     PLAN:    As noted above or sooner prn.    Sincerely,        Peng Cui MD

## 2025-02-03 ENCOUNTER — HOSPITAL ENCOUNTER (OUTPATIENT)
Age: 30
Setting detail: SPECIMEN
Discharge: HOME | End: 2025-02-03

## 2025-02-03 ENCOUNTER — ROUTINE PRENATAL (OUTPATIENT)
Dept: OBGYN CLINIC | Age: 30
End: 2025-02-03

## 2025-02-03 VITALS — SYSTOLIC BLOOD PRESSURE: 110 MMHG | DIASTOLIC BLOOD PRESSURE: 68 MMHG | BODY MASS INDEX: 28.19 KG/M2 | WEIGHT: 180 LBS

## 2025-02-03 DIAGNOSIS — Z34.93 ENCOUNTER FOR SUPERVISION OF LOW-RISK PREGNANCY IN THIRD TRIMESTER: Primary | ICD-10-CM

## 2025-02-03 DIAGNOSIS — Z3A.36 36 WEEKS GESTATION OF PREGNANCY: ICD-10-CM

## 2025-02-03 DIAGNOSIS — Z34.93 ENCOUNTER FOR SUPERVISION OF LOW-RISK PREGNANCY IN THIRD TRIMESTER: ICD-10-CM

## 2025-02-03 PROCEDURE — 0502F SUBSEQUENT PRENATAL CARE: CPT | Performed by: OBSTETRICS & GYNECOLOGY

## 2025-02-03 NOTE — PROGRESS NOTES
Maureen ErnandezRosenda is a 29 y.o. female 36w0d        OB History    Para Term  AB Living   2 1 1     1   SAB IAB Ectopic Molar Multiple Live Births           0 1      # Outcome Date GA Lbr Alejandro/2nd Weight Sex Type Anes PTL Lv   2 Current            1 Term 23 40w1d / 01:51 3.735 kg (8 lb 3.8 oz) M Vag-Spont EPI N KLAUS     Vitals  BP: 110/68  Weight - Scale: 81.6 kg (180 lb)    The patient was seen and evaluated. There was positive fetal movements. No contractions or leakage of fluid. Signs and symptoms of labor were reviewed.  The S/S of Pre-Eclampsia were reviewed with the patient in detail. She is to report any of these if they occur. She currently denies any of these.    The patient was instructed on fetal kick counts and was given a kick sheet to complete every 8 hours. She was instructed that the baby should move at a minimum of ten times within one hour after a meal. The patient was instructed to lay down on her left side twenty minutes after eating and count movements for up to one hour with a target value of ten movements.  She was instructed to notify the office if she did not make that target after two attempts or if after any attempt there was less than four movements.    The patient reports that the targets have been made Yes.    Insurance: [employer] [place]    IPV bag/mug given:  Genetic Information given/reviewed: 2024 LEWIS Lopez CNM   1st trimester education packet given: 2024 LEWIS Lopez CNM   2nd trimester education packet given:  3rd trimester education packet given:      FOB Name:    LMP approximate/unknown recommend dating off of USN 2024 LEWIS Lopez CNM     Last Pap Smear: 2022 Normal   [x] Urine collected for culture   [x] Negative    [] Positive   [x] UDS collected 2024 Roseline Moran MA  [x] Gc/ct collected 2024 Roseline Moran MA  [x] Prenatal Labs completed     Genetic

## 2025-02-06 LAB
MICROORGANISM SPEC CULT: NORMAL
SPECIMEN DESCRIPTION: NORMAL

## 2025-02-12 ENCOUNTER — ROUTINE PRENATAL (OUTPATIENT)
Dept: OBGYN CLINIC | Age: 30
End: 2025-02-12

## 2025-02-12 VITALS
DIASTOLIC BLOOD PRESSURE: 62 MMHG | SYSTOLIC BLOOD PRESSURE: 124 MMHG | BODY MASS INDEX: 28.79 KG/M2 | WEIGHT: 183.4 LBS | HEIGHT: 67 IN

## 2025-02-12 DIAGNOSIS — Z34.90 NORMAL PREGNANCY, ANTEPARTUM: Primary | ICD-10-CM

## 2025-02-12 DIAGNOSIS — Z87.59 HISTORY OF GESTATIONAL HYPERTENSION: ICD-10-CM

## 2025-02-12 DIAGNOSIS — Z3A.37 37 WEEKS GESTATION OF PREGNANCY: ICD-10-CM

## 2025-02-12 PROCEDURE — 0502F SUBSEQUENT PRENATAL CARE: CPT | Performed by: NURSE PRACTITIONER

## 2025-02-12 ASSESSMENT — PATIENT HEALTH QUESTIONNAIRE - PHQ9: DEPRESSION UNABLE TO ASSESS: PT REFUSES

## 2025-02-12 NOTE — PROGRESS NOTES
Presents for OB visit  Gestation 37w2d  Estimated Date of Delivery: 3/3/25    Insurance: [facility]    IPV bag/mug given:  Genetic Information given/reviewed: 2024 LEWIS Lopez CNM   1st trimester education packet given: 2024 LEWIS Lopez CNM   2nd trimester education packet given:  3rd trimester education packet given:      FOB Name:    LMP approximate/unknown recommend dating off of USN 2024 LEWIS Lopez CNM     Last Pap Smear: 2022 Normal   [x] Urine collected for culture   [x] Negative    [] Positive   [x] UDS collected 2024 Roseline Moran MA  [x] Gc/ct collected 2024 Roseline Moran MA  [x] Prenatal Labs completed     Genetic Screening:  [x]Accepted NIPS 2024 LEWIS Lopez CNM   [x]Completed  [x] Low risk     Carrier Screening:  [x] Known negative CF/SMA/Fragile X in media       Baby aspirin screen:  [x]Positive (hx ghtn)  []Negative    Early 1 hour GTT:  []Yes  [x] No    AFP: Declined on 2024   []Ordered  []Completed    Anatomy scan:  [x]Referral Sent  [x]Scheduled -10/22/24  [x]Completed  [x] Follow up 34 weeks    Fetal Echo:   [] Yes  [x] No    High Risk Factors:  Hx gHTN  - cmp/cbc/pc ratio at IPV  Short Interval Pregnancy     []Placed on High Risk List    Fetal Surveillance:  [] Yes  [x] No    Covid Vaccine:  Counseled on RSV vaccine 2025   Flu Vaccine:  []Given **  [] Declined **  Tdap:  [x]Given 2025 Kimberlee Lee, DO   [x] Declined 2024 LEWIS Lopez CNM   Rhogam: A POSITIVE   [x] Not indicated     1hr GTT:  [x] Results 104   3hr GTT: n/a    []Breast Pump Order Signed/Sent    36 weeks US:  [x]Completed - Vertex, MICKEY 16.37, EFW 5#13oz (49th %-tile)    GBS: 2/3/25   [] Positive   [x] Negative from 2/3/25    Apts with :  [x] Date: 2/3/25  Gestational Age: 36w0d  [] Date: ** Gestational Age: **    Apts with :  [x] Date: 2025

## 2025-02-12 NOTE — PATIENT INSTRUCTIONS
After Hours Number: You can call the office [phone]  or [phone]  Call if you have:  1.  Bleeding like a period  2.  Cramps or contractions greater than 2 hours  3.  If you are leaking fluid  4.  If you've a fever greater than 100°  5.  If you feel as if baby is not moving  6. If you have continuous vomiting over 3-4 hours   Counting Your Baby's Kicks: Care Instructions  Your Care Instructions    Counting your baby's kicks is one way your doctor can tell that your baby is healthy. Most women--especially in a first pregnancy--feel their baby move for the first time between 16 and 22 weeks. The movement may feel like flutters rather than kicks. Your baby may move more at certain times of the day. When you are active, you may notice less kicking than when you are resting. At your prenatal visits, your doctor will ask whether the baby is active.  In your last trimester, your doctor may ask you to count the number of times you feel your baby move.  Follow-up care is a key part of your treatment and safety. Be sure to make and go to all appointments, and call your doctor if you are having problems. It's also a good idea to know your test results and keep a list of the medicines you take.  How do you count fetal kicks?  A common method of checking your baby's movement is to count the number of kicks or moves you feel in 1 hour. Ten movements (such as kicks, flutters, or rolls) in 1 hour are normal. Some doctors suggest that you count in the morning until you get to 10 movements. Then you can quit for that day and start again the next day.  Pick your baby's most active time of day to count. This may be any time from morning to evening.  If you do not feel 10 movements in an hour, your baby may be sleeping. Wait for the next hour and count again.  When should you call for help?  Call your doctor now or seek immediate medical care if:    You noticed that your baby has stopped moving or is moving much less than

## 2025-02-18 ENCOUNTER — HOSPITAL ENCOUNTER (OUTPATIENT)
Age: 30
Setting detail: SPECIMEN
Discharge: HOME | End: 2025-02-18

## 2025-02-18 ENCOUNTER — ROUTINE PRENATAL (OUTPATIENT)
Dept: OBGYN CLINIC | Age: 30
End: 2025-02-18

## 2025-02-18 VITALS
SYSTOLIC BLOOD PRESSURE: 130 MMHG | HEIGHT: 67 IN | BODY MASS INDEX: 28.44 KG/M2 | WEIGHT: 181.2 LBS | DIASTOLIC BLOOD PRESSURE: 72 MMHG

## 2025-02-18 DIAGNOSIS — Z3A.38 38 WEEKS GESTATION OF PREGNANCY: ICD-10-CM

## 2025-02-18 DIAGNOSIS — Z87.59 HISTORY OF GESTATIONAL HYPERTENSION: ICD-10-CM

## 2025-02-18 DIAGNOSIS — R51.9 PREGNANCY HEADACHE IN THIRD TRIMESTER: ICD-10-CM

## 2025-02-18 DIAGNOSIS — O26.893 PREGNANCY HEADACHE IN THIRD TRIMESTER: ICD-10-CM

## 2025-02-18 DIAGNOSIS — Z34.90 NORMAL PREGNANCY, ANTEPARTUM: Primary | ICD-10-CM

## 2025-02-18 LAB
ALBUMIN SERPL-MCNC: 3.6 G/DL (ref 3.5–5.2)
ALBUMIN/GLOB SERPL: 1 {RATIO} (ref 1–2.5)
ALP SERPL-CCNC: 140 U/L (ref 35–104)
ALT SERPL-CCNC: 8 U/L (ref 10–35)
ANION GAP SERPL CALCULATED.3IONS-SCNC: 12 MMOL/L (ref 9–16)
AST SERPL-CCNC: 21 U/L (ref 10–35)
BASOPHILS # BLD: 0.06 K/UL (ref 0–0.2)
BASOPHILS NFR BLD: 1 % (ref 0–2)
BILIRUB SERPL-MCNC: 0.3 MG/DL (ref 0–1.2)
BUN SERPL-MCNC: 7 MG/DL (ref 6–20)
CALCIUM SERPL-MCNC: 9.5 MG/DL (ref 8.6–10.4)
CHLORIDE SERPL-SCNC: 103 MMOL/L (ref 98–107)
CO2 SERPL-SCNC: 22 MMOL/L (ref 20–31)
CREAT SERPL-MCNC: 0.6 MG/DL (ref 0.6–0.9)
EOSINOPHIL # BLD: 0.12 K/UL (ref 0–0.44)
EOSINOPHILS RELATIVE PERCENT: 1 % (ref 1–4)
ERYTHROCYTE [DISTWIDTH] IN BLOOD BY AUTOMATED COUNT: 13.8 % (ref 11.8–14.4)
GFR, ESTIMATED: >90 ML/MIN/1.73M2
GLUCOSE SERPL-MCNC: 72 MG/DL (ref 74–99)
HCT VFR BLD AUTO: 43.8 % (ref 36.3–47.1)
HGB BLD-MCNC: 14 G/DL (ref 11.9–15.1)
IMM GRANULOCYTES # BLD AUTO: 0.11 K/UL (ref 0–0.3)
IMM GRANULOCYTES NFR BLD: 1 %
LYMPHOCYTES NFR BLD: 1.82 K/UL (ref 1.1–3.7)
LYMPHOCYTES RELATIVE PERCENT: 16 % (ref 24–43)
MCH RBC QN AUTO: 30.1 PG (ref 25.2–33.5)
MCHC RBC AUTO-ENTMCNC: 32 G/DL (ref 28.4–34.8)
MCV RBC AUTO: 94.2 FL (ref 82.6–102.9)
MONOCYTES NFR BLD: 1.05 K/UL (ref 0.1–1.2)
MONOCYTES NFR BLD: 9 % (ref 3–12)
NEUTROPHILS NFR BLD: 72 % (ref 36–65)
NEUTS SEG NFR BLD: 8.27 K/UL (ref 1.5–8.1)
NRBC BLD-RTO: 0 PER 100 WBC
PLATELET # BLD AUTO: 315 K/UL (ref 138–453)
PMV BLD AUTO: 10 FL (ref 8.1–13.5)
POTASSIUM SERPL-SCNC: 4.2 MMOL/L (ref 3.7–5.3)
PROT SERPL-MCNC: 7.2 G/DL (ref 6.6–8.7)
RBC # BLD AUTO: 4.65 M/UL (ref 3.95–5.11)
SODIUM SERPL-SCNC: 137 MMOL/L (ref 136–145)
WBC OTHER # BLD: 11.4 K/UL (ref 3.5–11.3)

## 2025-02-18 PROCEDURE — 0502F SUBSEQUENT PRENATAL CARE: CPT | Performed by: NURSE PRACTITIONER

## 2025-02-18 ASSESSMENT — PATIENT HEALTH QUESTIONNAIRE - PHQ9: DEPRESSION UNABLE TO ASSESS: PT REFUSES

## 2025-02-18 NOTE — PROGRESS NOTES
same complications that may occur from a spontaneous delivery also apply to an induced delivery, as well as the following risks:   Stalled labor--If the medicine does not trigger labor, you may need a  section ().   Strong contractions--The medicine that causes contractions could make them too strong. Although rare, this can lead to fetal distress and uterine rupture. In the event that your contractions are too strong, your doctor will lower the dose or stop the medicine.    - Comprehensive Metabolic Panel; Future  - CBC with Auto Differential; Future  - Protein / Creatinine Ratio, Urine; Future    3. History of gestational hypertension    - Comprehensive Metabolic Panel; Future  - CBC with Auto Differential; Future  - Protein / Creatinine Ratio, Urine; Future    4. Pregnancy headache in third trimester  Her blood pressure is normal today 130/72.  She has not tried to take any Tylenol for this.  Discussed options of labor and delivery for evaluation for persistent headache versus check labs today and try to increase fluids and rest and take Tylenol when she gets home.  If headache is persistent after that notify us would recommend evaluation in labor and delivery given persistent headache and history of gestational hypertension and gestational age of pregnancy.  If lab work abnormal also discussed would consider recommending evaluation at labor delivery versus repeat blood pressure here.  - Comprehensive Metabolic Panel; Future  - CBC with Auto Differential; Future  - Protein / Creatinine Ratio, Urine; Future       Return 1 WEEK        PTL signs reviewed, if indicated  Kick Count Instructions given if indicated:  The patient was instructed on fetal kick counts and was given a kick sheet to complete every 8 hours. This is to begin at 28 weeks gestation. She was instructed that the baby should move at a minimum of ten times within one hour after a meal. The patient was instructed to lay down on her

## 2025-02-18 NOTE — PATIENT INSTRUCTIONS
rest, be sure to stay off your feet and rest as much as possible.  Keep a phone, phone book, notepad, and pen near the bed where you can easily reach them.  Gently stretch your legs every hour to maintain good blood flow.  Have another family member pack snacks and lunch food in a cooler close to your bed.  Use this time for activities that you usually cannot find time for, such as reading, craft projects, or letter writing.  You can keep track of your baby's health by noting the length of time it takes to count 10 movements (such as kicks, flutters, or rolls). Feeling 10 movements in less than 1 hour is considered normal. Track your baby's movements once each day. Bring this record with you to each prenatal visit.  When should you call for help?  Call 911 anytime you think you may need emergency care. For example, call if:    You passed out (lost consciousness).     You have a seizure.    Call your doctor now or seek immediate medical care if:    You have symptoms of preeclampsia, such as:  Sudden swelling of your face, hands, or feet.  New vision problems (such as dimness or blurring).  A severe headache.     Your blood pressure is higher than it should be, or it rises suddenly.     You have new nausea or vomiting.     You think that you are in labor.     You have pain in your belly or pelvis.    Watch closely for changes in your health, and be sure to contact your doctor if:    You gain weight rapidly.   Where can you learn more?  Go to [email or web address] and sign in to your [employer] account. Enter Z954 in the Search Health Information box to learn more about \"Preeclampsia: Care Instructions.\"     If you do not have an account, please click on the \"Sign Up Now\" link.  Current as of: September 5, 2018  Content Version: 12.0  © 3068-9645 Healthwise, Incorporated. Care instructions adapted under license by [employer]. If you have questions about a medical condition or this instruction, always

## 2025-02-19 LAB
CREAT UR-MCNC: 26 MG/DL (ref 28–217)
TOTAL PROTEIN, URINE: <4 MG/DL
URINE TOTAL PROTEIN CREATININE RATIO: ABNORMAL (ref 0–0.2)

## 2025-02-26 ENCOUNTER — ROUTINE PRENATAL (OUTPATIENT)
Dept: OBGYN CLINIC | Age: 30
End: 2025-02-26

## 2025-02-26 VITALS
DIASTOLIC BLOOD PRESSURE: 72 MMHG | HEIGHT: 67 IN | BODY MASS INDEX: 29.03 KG/M2 | SYSTOLIC BLOOD PRESSURE: 132 MMHG | WEIGHT: 185 LBS

## 2025-02-26 DIAGNOSIS — Z34.90 NORMAL PREGNANCY, ANTEPARTUM: Primary | ICD-10-CM

## 2025-02-26 DIAGNOSIS — Z3A.39 39 WEEKS GESTATION OF PREGNANCY: ICD-10-CM

## 2025-02-26 PROCEDURE — 0502F SUBSEQUENT PRENATAL CARE: CPT | Performed by: ADVANCED PRACTICE MIDWIFE

## 2025-02-26 NOTE — PROGRESS NOTES
SUBJECTIVE:    Maureen is here for her return OB visit. denies concerns today. Has IOL scheduled   She reports  feeling fetal movement.  She denies vaginal bleeding.  She denies vaginal discharge.  She denies leaking of fluid.  She denies uterine cramping.  She denies  nausea and/or vomiting.    OBJECTIVE:  Blood pressure 132/72, height 1.702 m (5' 7\"), weight 83.9 kg (185 lb), last menstrual period 05/18/2024, not currently breastfeeding.    Total weight gain: 27.2 kg (60 lb)    Declined SVE    Vaccinations:   [x]Accepted tdap  []Declined   [] Undecided   [] Educated on recommendations    ASSESSMENT/PLAN:  IUP @ 39+2 weeks  S=D    Normal Repeat Pregnancy  Due date is based on LMP and confirmed with 7w1d early dating ultrasound  Patient's prenatal labs are completed.  Patient's blood type A positive and rhogam is not indicated in the pregnancy.   Early glucola indicated: no  Patient Accepted  genetic screening.   Accepted and cell free DNA testingand test(s) are low risk trisomy 21/18/16 (NIPT)  Anatomy scan scheduled 10/22/24 completed. Placenta anterior,normal cord insertion: Yes cervical length 4.55 cm, NO low lying placenta, no placenta previa . Additional findings: yes, circumvallate placentation   F/up USN 12/12/24 presentation cephalic. MICKEY 12.81. BPP 8/8. EFW 53%  USN 1/28/25 presentation cephalic. MICKEY 16.37. BPP 8/8. EFW 49%  Second trimester 24-28 week labs:   [x] Ordered  [x] Completed 12/23/24  [x] Glucose screening 104  [x] Recommendations pass  Total weight gain in pregnancy reviewed: Yes  Fetal Kick Count was discussed and explained. She was instructed to lay on her left side 20 minutes after eating and count movements for up to 2 hours with a target value of 10 movements. Instructed to notify office if she does not make the target.  Reviewed GBS recommendations in pregnancy:  [x] Educated, will complete at 36 weeks and Maureen is agreeable  [] Collected at visit  [x] Negative from 2/3/25  [] Positive  []

## 2025-02-27 ENCOUNTER — APPOINTMENT (OUTPATIENT)
Dept: LABOR AND DELIVERY | Age: 30
End: 2025-02-27
Payer: COMMERCIAL

## 2025-02-27 ENCOUNTER — HOSPITAL ENCOUNTER (INPATIENT)
Age: 30
LOS: 2 days | Discharge: HOME | End: 2025-03-01
Attending: OBSTETRICS & GYNECOLOGY | Admitting: OBSTETRICS & GYNECOLOGY
Payer: COMMERCIAL

## 2025-02-27 ENCOUNTER — ANESTHESIA EVENT (OUTPATIENT)
Dept: LABOR AND DELIVERY | Age: 30
End: 2025-02-27
Payer: COMMERCIAL

## 2025-02-27 ENCOUNTER — ANESTHESIA (OUTPATIENT)
Dept: LABOR AND DELIVERY | Age: 30
End: 2025-02-27
Payer: COMMERCIAL

## 2025-02-27 PROBLEM — Z3A.39 39 WEEKS GESTATION OF PREGNANCY: Status: ACTIVE | Noted: 2025-02-27

## 2025-02-27 LAB
ABO + RH BLD: NORMAL
AMPHET UR QL SCN: NEGATIVE
ARM BAND NUMBER: NORMAL
BARBITURATES UR QL SCN: NEGATIVE
BASOPHILS # BLD: 0.07 K/UL (ref 0–0.2)
BASOPHILS NFR BLD: 1 % (ref 0–2)
BENZODIAZ UR QL: NEGATIVE
BLOOD BANK SAMPLE EXPIRATION: NORMAL
BLOOD GROUP ANTIBODIES SERPL: NEGATIVE
CANNABINOIDS UR QL SCN: NEGATIVE
COCAINE UR QL SCN: NEGATIVE
EOSINOPHIL # BLD: 0.07 K/UL (ref 0–0.44)
EOSINOPHILS RELATIVE PERCENT: 1 % (ref 1–4)
ERYTHROCYTE [DISTWIDTH] IN BLOOD BY AUTOMATED COUNT: 13.6 % (ref 11.8–14.4)
FENTANYL UR QL: NEGATIVE
HCT VFR BLD AUTO: 42.5 % (ref 36.3–47.1)
HGB BLD-MCNC: 14.2 G/DL (ref 11.9–15.1)
IMM GRANULOCYTES # BLD AUTO: 0.13 K/UL (ref 0–0.3)
IMM GRANULOCYTES NFR BLD: 1 %
LYMPHOCYTES NFR BLD: 1.82 K/UL (ref 1.1–3.7)
LYMPHOCYTES RELATIVE PERCENT: 17 % (ref 24–43)
MCH RBC QN AUTO: 29.8 PG (ref 25.2–33.5)
MCHC RBC AUTO-ENTMCNC: 33.4 G/DL (ref 28.4–34.8)
MCV RBC AUTO: 89.1 FL (ref 82.6–102.9)
METHADONE UR QL: NEGATIVE
MONOCYTES NFR BLD: 0.85 K/UL (ref 0.1–1.2)
MONOCYTES NFR BLD: 8 % (ref 3–12)
NEUTROPHILS NFR BLD: 73 % (ref 36–65)
NEUTS SEG NFR BLD: 7.99 K/UL (ref 1.5–8.1)
NRBC BLD-RTO: 0 PER 100 WBC
OPIATES UR QL SCN: NEGATIVE
OXYCODONE UR QL SCN: NEGATIVE
PCP UR QL SCN: NEGATIVE
PLATELET # BLD AUTO: 353 K/UL (ref 138–453)
PMV BLD AUTO: 10.2 FL (ref 8.1–13.5)
RBC # BLD AUTO: 4.77 M/UL (ref 3.95–5.11)
T PALLIDUM AB SER QL IA: NONREACTIVE
TEST INFORMATION: NORMAL
WBC OTHER # BLD: 10.9 K/UL (ref 3.5–11.3)

## 2025-02-27 PROCEDURE — 1220000000 HC SEMI PRIVATE OB R&B

## 2025-02-27 PROCEDURE — 2580000003 HC RX 258

## 2025-02-27 PROCEDURE — 3E033VJ INTRODUCTION OF OTHER HORMONE INTO PERIPHERAL VEIN, PERCUTANEOUS APPROACH: ICD-10-PCS

## 2025-02-27 PROCEDURE — 6360000002 HC RX W HCPCS: Performed by: NURSE ANESTHETIST, CERTIFIED REGISTERED

## 2025-02-27 PROCEDURE — 6370000000 HC RX 637 (ALT 250 FOR IP)

## 2025-02-27 PROCEDURE — 86780 TREPONEMA PALLIDUM: CPT

## 2025-02-27 PROCEDURE — 3700000025 EPIDURAL BLOCK: Performed by: ANESTHESIOLOGY

## 2025-02-27 PROCEDURE — 6360000002 HC RX W HCPCS: Performed by: ANESTHESIOLOGY

## 2025-02-27 PROCEDURE — 6360000002 HC RX W HCPCS

## 2025-02-27 PROCEDURE — 86900 BLOOD TYPING SEROLOGIC ABO: CPT

## 2025-02-27 PROCEDURE — 86850 RBC ANTIBODY SCREEN: CPT

## 2025-02-27 PROCEDURE — 86901 BLOOD TYPING SEROLOGIC RH(D): CPT

## 2025-02-27 PROCEDURE — 80307 DRUG TEST PRSMV CHEM ANLYZR: CPT

## 2025-02-27 PROCEDURE — 85025 COMPLETE CBC W/AUTO DIFF WBC: CPT

## 2025-02-27 RX ORDER — NALOXONE HYDROCHLORIDE 0.4 MG/ML
INJECTION, SOLUTION INTRAMUSCULAR; INTRAVENOUS; SUBCUTANEOUS PRN
Status: DISCONTINUED | OUTPATIENT
Start: 2025-02-27 | End: 2025-02-28 | Stop reason: HOSPADM

## 2025-02-27 RX ORDER — PROCHLORPERAZINE EDISYLATE 5 MG/ML
10 INJECTION INTRAMUSCULAR; INTRAVENOUS ONCE
Status: COMPLETED | OUTPATIENT
Start: 2025-02-27 | End: 2025-02-27

## 2025-02-27 RX ORDER — ONDANSETRON 2 MG/ML
4 INJECTION INTRAMUSCULAR; INTRAVENOUS EVERY 6 HOURS PRN
Status: DISCONTINUED | OUTPATIENT
Start: 2025-02-27 | End: 2025-02-28 | Stop reason: SDUPTHER

## 2025-02-27 RX ORDER — DIPHENHYDRAMINE HYDROCHLORIDE 50 MG/ML
25 INJECTION INTRAMUSCULAR; INTRAVENOUS EVERY 4 HOURS PRN
Status: DISCONTINUED | OUTPATIENT
Start: 2025-02-27 | End: 2025-02-28 | Stop reason: HOSPADM

## 2025-02-27 RX ORDER — MORPHINE SULFATE 2 MG/ML
2 INJECTION, SOLUTION INTRAMUSCULAR; INTRAVENOUS ONCE
Status: COMPLETED | OUTPATIENT
Start: 2025-02-27 | End: 2025-02-27

## 2025-02-27 RX ORDER — SENNA AND DOCUSATE SODIUM 50; 8.6 MG/1; MG/1
1 TABLET, FILM COATED ORAL DAILY
Status: DISCONTINUED | OUTPATIENT
Start: 2025-02-27 | End: 2025-02-28 | Stop reason: HOSPADM

## 2025-02-27 RX ORDER — SEVOFLURANE 250 ML/250ML
1 LIQUID RESPIRATORY (INHALATION) CONTINUOUS PRN
Status: DISCONTINUED | OUTPATIENT
Start: 2025-02-27 | End: 2025-03-01 | Stop reason: HOSPADM

## 2025-02-27 RX ORDER — SODIUM CHLORIDE, SODIUM LACTATE, POTASSIUM CHLORIDE, CALCIUM CHLORIDE 600; 310; 30; 20 MG/100ML; MG/100ML; MG/100ML; MG/100ML
INJECTION, SOLUTION INTRAVENOUS CONTINUOUS
Status: DISCONTINUED | OUTPATIENT
Start: 2025-02-27 | End: 2025-02-28 | Stop reason: HOSPADM

## 2025-02-27 RX ORDER — ACETAMINOPHEN 500 MG
1000 TABLET ORAL EVERY 6 HOURS PRN
Status: DISCONTINUED | OUTPATIENT
Start: 2025-02-27 | End: 2025-02-28 | Stop reason: HOSPADM

## 2025-02-27 RX ORDER — LIDOCAINE HYDROCHLORIDE 10 MG/ML
INJECTION, SOLUTION EPIDURAL; INFILTRATION; INTRACAUDAL; PERINEURAL
Status: DISCONTINUED | OUTPATIENT
Start: 2025-02-27 | End: 2025-02-28 | Stop reason: SDUPTHER

## 2025-02-27 RX ORDER — SODIUM CHLORIDE 0.9 % (FLUSH) 0.9 %
5-40 SYRINGE (ML) INJECTION PRN
Status: DISCONTINUED | OUTPATIENT
Start: 2025-02-27 | End: 2025-02-28 | Stop reason: HOSPADM

## 2025-02-27 RX ORDER — DIPHENHYDRAMINE HCL 25 MG
25 TABLET ORAL EVERY 4 HOURS PRN
Status: DISCONTINUED | OUTPATIENT
Start: 2025-02-27 | End: 2025-02-28 | Stop reason: HOSPADM

## 2025-02-27 RX ORDER — SODIUM CHLORIDE 0.9 % (FLUSH) 0.9 %
5-40 SYRINGE (ML) INJECTION EVERY 12 HOURS SCHEDULED
Status: DISCONTINUED | OUTPATIENT
Start: 2025-02-27 | End: 2025-02-28 | Stop reason: HOSPADM

## 2025-02-27 RX ORDER — EPHEDRINE SULFATE/0.9% NACL/PF 25 MG/5 ML
5 SYRINGE (ML) INTRAVENOUS PRN
Status: DISCONTINUED | OUTPATIENT
Start: 2025-02-28 | End: 2025-02-28 | Stop reason: HOSPADM

## 2025-02-27 RX ORDER — LIDOCAINE HYDROCHLORIDE AND EPINEPHRINE 15; 5 MG/ML; UG/ML
INJECTION, SOLUTION EPIDURAL
Status: DISCONTINUED | OUTPATIENT
Start: 2025-02-27 | End: 2025-02-28 | Stop reason: SDUPTHER

## 2025-02-27 RX ORDER — MORPHINE SULFATE 10 MG/ML
8 INJECTION INTRAVENOUS ONCE
Status: COMPLETED | OUTPATIENT
Start: 2025-02-27 | End: 2025-02-27

## 2025-02-27 RX ORDER — EPHEDRINE SULFATE/0.9% NACL/PF 25 MG/5 ML
10 SYRINGE (ML) INTRAVENOUS
Status: DISCONTINUED | OUTPATIENT
Start: 2025-02-27 | End: 2025-02-28 | Stop reason: HOSPADM

## 2025-02-27 RX ORDER — SODIUM CHLORIDE 9 MG/ML
INJECTION, SOLUTION INTRAVENOUS PRN
Status: DISCONTINUED | OUTPATIENT
Start: 2025-02-27 | End: 2025-02-28 | Stop reason: HOSPADM

## 2025-02-27 RX ORDER — LIDOCAINE HYDROCHLORIDE 10 MG/ML
30 INJECTION, SOLUTION EPIDURAL; INFILTRATION; INTRACAUDAL; PERINEURAL PRN
Status: DISCONTINUED | OUTPATIENT
Start: 2025-02-27 | End: 2025-02-28 | Stop reason: HOSPADM

## 2025-02-27 RX ORDER — ROPIVACAINE HYDROCHLORIDE 2 MG/ML
INJECTION, SOLUTION EPIDURAL; INFILTRATION; PERINEURAL
Status: COMPLETED
Start: 2025-02-27 | End: 2025-02-28

## 2025-02-27 RX ADMIN — SODIUM CHLORIDE, SODIUM LACTATE, POTASSIUM CHLORIDE, AND CALCIUM CHLORIDE: .6; .31; .03; .02 INJECTION, SOLUTION INTRAVENOUS at 12:35

## 2025-02-27 RX ADMIN — LIDOCAINE HYDROCHLORIDE,EPINEPHRINE BITARTRATE 5 ML: 15; .005 INJECTION, SOLUTION EPIDURAL; INFILTRATION; INTRACAUDAL; PERINEURAL at 22:50

## 2025-02-27 RX ADMIN — Medication 50 MCG: at 16:02

## 2025-02-27 RX ADMIN — LIDOCAINE HYDROCHLORIDE 3 ML: 10 INJECTION, SOLUTION EPIDURAL; INFILTRATION; INTRACAUDAL; PERINEURAL at 22:44

## 2025-02-27 RX ADMIN — Medication 25 MCG: at 11:37

## 2025-02-27 RX ADMIN — ROPIVACAINE HYDROCHLORIDE 10 ML/HR: 2 INJECTION, SOLUTION EPIDURAL; INFILTRATION at 23:00

## 2025-02-27 RX ADMIN — MORPHINE SULFATE 8 MG: 10 INJECTION INTRAVENOUS at 19:05

## 2025-02-27 RX ADMIN — PROCHLORPERAZINE EDISYLATE 10 MG: 5 INJECTION INTRAMUSCULAR; INTRAVENOUS at 19:05

## 2025-02-27 RX ADMIN — MORPHINE SULFATE 2 MG: 2 INJECTION, SOLUTION INTRAMUSCULAR; INTRAVENOUS at 19:07

## 2025-02-27 ASSESSMENT — PAIN SCALES - GENERAL
PAINLEVEL_OUTOF10: 5
PAINLEVEL_OUTOF10: 7
PAINLEVEL_OUTOF10: 7

## 2025-02-27 ASSESSMENT — PAIN DESCRIPTION - LOCATION
LOCATION: ABDOMEN
LOCATION: ABDOMEN

## 2025-02-27 ASSESSMENT — PAIN DESCRIPTION - DESCRIPTORS: DESCRIPTORS: ACHING;CRAMPING

## 2025-02-27 NOTE — H&P
OBSTETRICAL HISTORY AND PHYSICAL  [facility]    Date: 2025       Time: 2:18 PM   Patient Name: Maureen Acuna     Patient : 1995  Room/Bed: 0710/0710-01    Admission Date/Time: 2025 10:26 AM      CC: RR IOL     HPI: Maureen Acuna is a 29 y.o.  at 39w3d presents for RR IOL.     Patient denies any fever, chills, N/V, headaches, vision changes, chest pain, shortness of breath, RUQ pain, abdominal pain, and increased swelling/tenderness in bilateral lower extremities. Patient denies any vaginal discharge and any urinary complaints. The patient reports fetal movement is present, complains of contractions, denies loss of fluid, denies vaginal bleeding.    DATING:  LMP: Patient's last menstrual period was 2024 (approximate).  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    39 weeks gestation of pregnancy        Steroids Given In This Pregnancy:  no     REVIEW OF SYSTEMS:  Constitutional: negative fever, negative chills, negative weight changes   HEENT: negative visual disturbances, negative headaches, negative dizziness, negative hearing loss  Breast: Negative breast abnormalities, negative breast lumps, negative nipple discharge  Respiratory: negative dyspnea, negative cough, negative SOB  Cardiovascular: negative chest pain, negative palpitations, negative arrhythmia, negative syncope   Gastrointestinal: negative abdominal pain, negative RUQ pain, negative N/V, negative diarrhea, negative constipation, negative bowel changes, negative heartburn   Genitourinary: negative dysuria, negative hematuria, negative urinary incontinence,  questions were answered.    Attending's Name: Dr. Chelsea Shepherd,   Ob/Gyn Resident  2/27/2025, 2:18 PM

## 2025-02-27 NOTE — CARE COORDINATION
ANTEPARTUM NOTE    39 weeks gestation of pregnancy [Z3A.39]    Maureen was admitted to L&D on 2/27/25 for IOL @ 39w3d    OB GYN Provider: Dr. Tran    Will meet with patient after delivery to verify name/address/phone/insurance and discuss discharge planning.     Anticipate DC home 2 nights after vaginal delivery or 4 nights after C/S delivery as long as hemodynamically stable.

## 2025-02-27 NOTE — PROGRESS NOTES
Labor Progress Note    Maureen Acuna is a 29 y.o. female  at 39w3d  The patient was seen and examined. Her pain is well controlled. She reports fetal movement is present, denies contractions, denies loss of fluid, denies vaginal bleeding.       Vital Signs:  Vitals:    25 1051   BP: 129/75   Pulse: 91   Resp: 16   Temp: 98.6 °F (37 °C)   TempSrc: Oral        FHT: 140, moderate variability, accelerations present, decelerations absent  Contractions: irregular, every 3-10 minutes    Chaperone for Intimate Exam: Chaperone was present for entire exam, Chaperone Name: MI Cervantes  Cervical Exam:   Pitocin: @ 0 mu/min    Membranes: Intact  Scalp Electrode in place: absent  Intrauterine Pressure Catheter in Place: absent    Interventions: SVE    Assessment/Plan:  Maureen Acuna is a 29 y.o. female  at 39w3d admitted for RR IOL   - GBS negative, No indication for GBS prophylaxis   - VSS, afebrile   - cEFM/TOCO: CAT 1   - SVE:    - Cytotec 50 mcg BU ordered   - Will continue to monitor closely      Attending updated and in agreement with plan    Katey Shepherd DO  Ob/Gyn Resident  2025, 3:46 PM

## 2025-02-27 NOTE — FLOWSHEET NOTE
Pt arrived for scheduled induction of labor. Pt denies any contractions, leakage of fluid, vaginal bleeding, or discharge. Positive fetal movement. Pt orientated to room. Call light within reach. Resident aware of arrival.

## 2025-02-27 NOTE — DISCHARGE SUMMARY
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 female  at 39w3d admitted for risk reducing induction of labor; received Cytotec 25 mcg VA x1, Cytotec 50 mcg BU x2, Morphine/Compazine x1, Gibson balloon, Amniotomy, pitocin.     She delivered by induced vaginal a Live Born infant on 25.       Information for the patient's :  Foster Acuna [[id number]]   male   Birth Weight: 3.435 kg (7 lb 9.2 oz)    Apgars: 8 at 1 minute and 9 at 5 minutes.     Postpartum course:   PPD#0: Patient met criteria for gHTN  PPD#1: PreE labs wnl, P/C 0.25    Course of patient: complicated by new dx of gHTN    Discharge to: Home    Readmission planned: no     Indication for 6 week PP 2 hour GTT?: no     Eligible for 2 week PP virtual visit? no -private patient    Recommendations on Discharge:     Medications:      Medication List        START taking these medications      acetaminophen 500 MG tablet  Commonly known as: TYLENOL  Take 2 tablets by mouth every 6 hours as needed for Pain     ibuprofen 600 MG tablet  Commonly known as: ADVIL;MOTRIN  Take 1 tablet by mouth every 6  hours as needed for Pain     sennosides-docusate sodium 8.6-50 MG tablet  Commonly known as: SENOKOT-S  Take 1 tablet by mouth daily            CONTINUE taking these medications      PRENATAL 1 PO               Where to Get Your Medications        These medications were sent to [facility] - [place], [place] - [address] - P [phone] - F [phone] [address] [facility] [address]      Phone: [phone]   acetaminophen 500 MG tablet  ibuprofen 600 MG tablet  sennosides-docusate sodium 8.6-50 MG tablet         Activity: pelvic rest x 6 weeks  Diet: regular diet  Follow up: 3 days for BP check    Condition on discharge: stable    Discharge date: 3/1/2025    Laura Martinez MD  Ob/Gyn Resident    Comments:  Home care and follow-up care were reviewed.  Pelvic rest, and birth control were reviewed. Signs and symptoms of mastitis and post partum depression were reviewed. The patient is to notify her physician if any of these occur. The patient was counseled on secondary smoke risks and the increased risk of sudden infant death syndrome and respiratory problems to her baby with exposure. She was counseled on various alternate recommendations to decrease the exposure to secondary smoke to her children.

## 2025-02-28 PROBLEM — Z3A.39 39 WEEKS GESTATION OF PREGNANCY: Status: RESOLVED | Noted: 2025-02-27 | Resolved: 2025-02-28

## 2025-02-28 PROBLEM — Z3A.35 35 WEEKS GESTATION OF PREGNANCY: Status: RESOLVED | Noted: 2025-01-28 | Resolved: 2025-02-28

## 2025-02-28 PROCEDURE — 6370000000 HC RX 637 (ALT 250 FOR IP)

## 2025-02-28 PROCEDURE — 59400 OBSTETRICAL CARE: CPT | Performed by: STUDENT IN AN ORGANIZED HEALTH CARE EDUCATION/TRAINING PROGRAM

## 2025-02-28 PROCEDURE — 2580000003 HC RX 258

## 2025-02-28 PROCEDURE — 2500000003 HC RX 250 WO HCPCS

## 2025-02-28 PROCEDURE — 10907ZC DRAINAGE OF AMNIOTIC FLUID, THERAPEUTIC FROM PRODUCTS OF CONCEPTION, VIA NATURAL OR ARTIFICIAL OPENING: ICD-10-PCS | Performed by: STUDENT IN AN ORGANIZED HEALTH CARE EDUCATION/TRAINING PROGRAM

## 2025-02-28 PROCEDURE — 7200000001 HC VAGINAL DELIVERY

## 2025-02-28 PROCEDURE — 1220000000 HC SEMI PRIVATE OB R&B

## 2025-02-28 PROCEDURE — 6360000002 HC RX W HCPCS

## 2025-02-28 RX ORDER — SODIUM CHLORIDE, SODIUM LACTATE, POTASSIUM CHLORIDE, AND CALCIUM CHLORIDE .6; .31; .03; .02 G/100ML; G/100ML; G/100ML; G/100ML
500 INJECTION, SOLUTION INTRAVENOUS PRN
Status: DISCONTINUED | OUTPATIENT
Start: 2025-02-28 | End: 2025-03-01 | Stop reason: HOSPADM

## 2025-02-28 RX ORDER — SENNA AND DOCUSATE SODIUM 50; 8.6 MG/1; MG/1
1 TABLET, FILM COATED ORAL DAILY
Qty: 60 TABLET | Refills: 1 | Status: SHIPPED | OUTPATIENT
Start: 2025-02-28

## 2025-02-28 RX ORDER — IBUPROFEN 600 MG/1
600 TABLET, FILM COATED ORAL EVERY 6 HOURS PRN
Qty: 60 TABLET | Refills: 1 | Status: SHIPPED | OUTPATIENT
Start: 2025-02-28

## 2025-02-28 RX ORDER — ONDANSETRON 4 MG/1
4 TABLET, ORALLY DISINTEGRATING ORAL EVERY 6 HOURS PRN
Status: DISCONTINUED | OUTPATIENT
Start: 2025-02-28 | End: 2025-03-01 | Stop reason: HOSPADM

## 2025-02-28 RX ORDER — IBUPROFEN 600 MG/1
600 TABLET, FILM COATED ORAL EVERY 6 HOURS
Status: DISCONTINUED | OUTPATIENT
Start: 2025-02-28 | End: 2025-03-01 | Stop reason: HOSPADM

## 2025-02-28 RX ORDER — ONDANSETRON 2 MG/ML
4 INJECTION INTRAMUSCULAR; INTRAVENOUS EVERY 6 HOURS PRN
Status: DISCONTINUED | OUTPATIENT
Start: 2025-02-28 | End: 2025-03-01 | Stop reason: HOSPADM

## 2025-02-28 RX ORDER — ONDANSETRON 4 MG/1
4 TABLET, ORALLY DISINTEGRATING ORAL EVERY 6 HOURS PRN
Status: DISCONTINUED | OUTPATIENT
Start: 2025-02-28 | End: 2025-02-28

## 2025-02-28 RX ORDER — CALCIUM CARBONATE 500 MG/1
500 TABLET, CHEWABLE ORAL 3 TIMES DAILY PRN
Status: DISCONTINUED | OUTPATIENT
Start: 2025-02-28 | End: 2025-03-01 | Stop reason: HOSPADM

## 2025-02-28 RX ORDER — SODIUM CHLORIDE 0.9 % (FLUSH) 0.9 %
5-40 SYRINGE (ML) INJECTION PRN
Status: DISCONTINUED | OUTPATIENT
Start: 2025-02-28 | End: 2025-03-01 | Stop reason: HOSPADM

## 2025-02-28 RX ORDER — ONDANSETRON 2 MG/ML
4 INJECTION INTRAMUSCULAR; INTRAVENOUS EVERY 6 HOURS PRN
Status: DISCONTINUED | OUTPATIENT
Start: 2025-02-28 | End: 2025-02-28

## 2025-02-28 RX ORDER — ACETAMINOPHEN 500 MG
1000 TABLET ORAL EVERY 6 HOURS PRN
Qty: 120 TABLET | Refills: 1 | Status: SHIPPED | OUTPATIENT
Start: 2025-02-28

## 2025-02-28 RX ORDER — DOCUSATE SODIUM 100 MG/1
100 CAPSULE, LIQUID FILLED ORAL 2 TIMES DAILY
Status: DISCONTINUED | OUTPATIENT
Start: 2025-02-28 | End: 2025-03-01 | Stop reason: HOSPADM

## 2025-02-28 RX ORDER — SODIUM CHLORIDE 9 MG/ML
INJECTION, SOLUTION INTRAVENOUS PRN
Status: DISCONTINUED | OUTPATIENT
Start: 2025-02-28 | End: 2025-03-01 | Stop reason: HOSPADM

## 2025-02-28 RX ORDER — SODIUM CHLORIDE 0.9 % (FLUSH) 0.9 %
5-40 SYRINGE (ML) INJECTION EVERY 12 HOURS SCHEDULED
Status: DISCONTINUED | OUTPATIENT
Start: 2025-02-28 | End: 2025-03-01 | Stop reason: HOSPADM

## 2025-02-28 RX ORDER — ACETAMINOPHEN 500 MG
1000 TABLET ORAL EVERY 6 HOURS
Status: DISCONTINUED | OUTPATIENT
Start: 2025-02-28 | End: 2025-03-01 | Stop reason: HOSPADM

## 2025-02-28 RX ORDER — LANOLIN
CREAM (ML) TOPICAL PRN
Status: DISCONTINUED | OUTPATIENT
Start: 2025-02-28 | End: 2025-03-01 | Stop reason: HOSPADM

## 2025-02-28 RX ADMIN — ANTACID TABLETS 500 MG: 500 TABLET, CHEWABLE ORAL at 00:52

## 2025-02-28 RX ADMIN — BENZOCAINE AND LEVOMENTHOL: 200; 5 SPRAY TOPICAL at 11:53

## 2025-02-28 RX ADMIN — IBUPROFEN 600 MG: 600 TABLET, FILM COATED ORAL at 22:39

## 2025-02-28 RX ADMIN — ACETAMINOPHEN 1000 MG: 500 TABLET ORAL at 11:53

## 2025-02-28 RX ADMIN — Medication 1 MILLI-UNITS/MIN: at 03:15

## 2025-02-28 RX ADMIN — SODIUM CHLORIDE, SODIUM LACTATE, POTASSIUM CHLORIDE, AND CALCIUM CHLORIDE: .6; .31; .03; .02 INJECTION, SOLUTION INTRAVENOUS at 02:00

## 2025-02-28 RX ADMIN — IBUPROFEN 600 MG: 600 TABLET, FILM COATED ORAL at 10:07

## 2025-02-28 RX ADMIN — ROPIVACAINE HYDROCHLORIDE 10 ML/HR: 2 INJECTION, SOLUTION EPIDURAL; INFILTRATION at 06:14

## 2025-02-28 RX ADMIN — ACETAMINOPHEN 1000 MG: 500 TABLET ORAL at 19:09

## 2025-02-28 RX ADMIN — SODIUM CHLORIDE, PRESERVATIVE FREE 10 ML: 5 INJECTION INTRAVENOUS at 22:40

## 2025-02-28 RX ADMIN — DOCUSATE SODIUM 100 MG: 100 CAPSULE, LIQUID FILLED ORAL at 22:40

## 2025-02-28 RX ADMIN — IBUPROFEN 600 MG: 600 TABLET, FILM COATED ORAL at 16:21

## 2025-02-28 ASSESSMENT — PAIN DESCRIPTION - DESCRIPTORS
DESCRIPTORS: CRAMPING
DESCRIPTORS: CRAMPING
DESCRIPTORS: CRAMPING;DISCOMFORT
DESCRIPTORS: CRAMPING;DISCOMFORT

## 2025-02-28 ASSESSMENT — PAIN SCALES - GENERAL
PAINLEVEL_OUTOF10: 2
PAINLEVEL_OUTOF10: 1
PAINLEVEL_OUTOF10: 1
PAINLEVEL_OUTOF10: 2
PAINLEVEL_OUTOF10: 2

## 2025-02-28 ASSESSMENT — PAIN DESCRIPTION - LOCATION
LOCATION: ABDOMEN

## 2025-02-28 NOTE — CARE COORDINATION
Social Work     Sw reviewed medical record (current active problem list) and tox screens and found no current concerns.     Sw spoke with mom briefly to explain Sw role, inquire if any needs or concerns, and provide safe sleep education and discuss.  Mom denied any needs or questions and informs baby has a safe sleep environment (crib).     Mom denied any current s/s of anxiety or depression and is aware to reach out to OB if any s/s occur after dc.     Mom reports a really good support system and denied any current questions or needs.      Mom reports this is her 2nd child, she also has a 2 year old son.       [facility] ([facility]      Sw encouraged mom to reach out if any issues or concerns arise.

## 2025-02-28 NOTE — ANESTHESIA PROCEDURE NOTES
Epidural Block    Patient location during procedure: OB  Reason for block: labor epidural  Staffing  Performed: resident/CRNA   Anesthesiologist: Iliana Cummings MD  Resident/CRNA: Daniel Zambrano APRN - CRNA  Performed by: Daniel Zambrano APRN - CRNA  Authorized by: Iliana Cummings MD    Epidural  Patient position: sitting  Prep: Betadine  Patient monitoring: continuous pulse ox and frequent blood pressure checks  Approach: midline  Location: L3-4  Injection technique: ROXIE air  Guidance: paresthesia technique  Provider prep: mask and sterile gloves  Needle  Needle type: Tuohy   Needle gauge: 17 G  Needle length: 3.5 in  Needle insertion depth: 5 cm  Catheter type: stylet  Catheter size: 19 G  Catheter at skin depth: 11 cm  Test dose: negativeCatheter Secured: tegaderm and tape  Assessment  Sensory level: T8  Hemodynamics: stable  Attempts: 1  Outcomes: uncomplicated and patient tolerated procedure well  Preanesthetic Checklist  Completed: patient identified, IV checked, site marked, risks and benefits discussed, surgical/procedural consents, equipment checked, pre-op evaluation, timeout performed, anesthesia consent given, oxygen available, monitors applied/VS acknowledged, fire risk safety assessment completed and verbalized and blood product R/B/A discussed and consented

## 2025-02-28 NOTE — L&D DELIVERY NOTE
Armand Foster Reddy [[id number]]      Labor Events     Labor: No   Steroids: None  Cervical Ripening Date/Time:      Antibiotics Received during Labor: No  Rupture Date/Time:  25 00:25:00   Rupture Type: AROM  Fluid Color: Clear, Bloody Show  Fluid Odor: None  Induction: Misoprostol       Anesthesia    Method: Epidural, Nitrous Oxide       Labor Event Times      Labor onset date/time:        Dilation complete date/time:  25 07:18:00     Start pushing date/time:  2025 07:18:00   Decision date/time (emergent ):            Delivery Details      Delivery Date: 25 Delivery Time: 08:44:00                 Cord                  Placenta           Lacerations           Blood Loss  Mother: Maureen Acuna #[id number]     Start of Mother's Information      Delivery Blood Loss   Intrapartum & Postpartum: 254 - 25 0855    Delivery Admission: 25 1026 - 25 0855         Intrapartum & Postpartum Delivery Admission    None                  End of Mother's Information  Mother: Maureen Acuna #[id number]                Delivery Providers    Delivering clinician: Kimberlee Lee DO     Provider Role    Kimberlee Lee DO Obstetrician    NonnenmEusebia patel RN Primary Nurse     Primary  Nurse     NICU Nurse     Neonatologist     Anesthesiologist     Nurse Anesthetist     Nurse Practitioner     Midwife     Nursery Nurse     Respiratory Therapist     Scrub Tech     Assistant Surgeon               Assessment          Skin Color:   Heart Rate:   Reflex Irritability:   Muscle Tone:   Respiratory Effort:   Total:            1 Minute:         5 Minute:                                                 [place] Measurements                 Vaginal Delivery Note  Department of Obstetrics and Gynecology  [facility]       Patient: Maureen JUNIOR Armand   : 1995  MRN: [id number]   Date of delivery: 25      Pre-operative Diagnosis: Maureen Acuna  at 39w4d  Risk reducing induction of labor  History of Heart Murmur  History gHTN (G1)  Circumvallate placenta in third trimester  BMI 28    Post-operative Diagnosis:  [place] living infant male  Risk reducing induction of labor  History of Heart Murmur  History gHTN (G1)  Circumvallate placenta in third trimester  BMI 28    Delivering Obstetrician & Assistant(s): Dr. Lee; Flores Jiang MD, PGY1    Infant Information:   Information for the patient's :  Foster Acuna [[id number]]      Information for the patient's :  Foster Acuna [[id number]]        Apgar scores: 8 at 1 minute and 9 at 5 minutes.     Anesthesia:  epidural anesthesia    Application and Delivery:    She was known to be GBS negative and did not receive antibiotic prophylaxis.     The patient progressed well, received an epidural, became complete and felt the urge to push. After pushing with contractions the fetal head delivered Cephalic, left occiput anterior over an intact perineum, nuchal cord was present and delivered through.  The anterior, then posterior shoulder delivered easily and atraumatically followed by the rest of the infant. Nose and mouth suctioned with bulb suction, infant was stimulated and dried. Cord was clamped and cut after one minute delayed cord clamping and infant was placed on maternal chest, and attended by RN for evaluation. The delivery of the placenta was spontaneous and appeared intact, whole, and that the umbilical cord had three vessels noted. Pitocin was started. The vagina was swept of all clots and debris. The perineum and vagina were evaluated and no lacerations were found. Fundal massage was applied to stabilize bleeding. Hemostasis was noted. Mother and baby tolerated procedure well.     Dr. Lee was present for entire delivery.    Delivery Summary:       Specimen: cord blood and cord gases  Quantitative

## 2025-02-28 NOTE — PROGRESS NOTES
POST PARTUM DAY # 1    Maureen Acuna is a 29 y.o. female  This patient was seen & examined today. She is s/p  25    Her pregnancy was complicated by:   Patient Active Problem List   Diagnosis    Acne    Astigmatism    Heart Murmur    Hx Migraines     23 M Apg 8/9 Wt 8#3    gHTN (G1 and G2)    Epidermal cyst of neck    Circumvallate placenta in third trimester     25 M Apg 8/9 Wt 7#9       Today she is doing well without any chief complaint.  She denies chest pain, shortness of breath, headache, lightheadedness, and blurred vision. She is  breast feeding and she denies any breast tenderness. She is ambulating well. Her lochia is light.Her voiding pattern is normal. She currently denies any symptoms of postpartum blues. She is tolerating solids.     Vital Signs:  Vitals:    25 1115 25 1603 25 2100 25 0515   BP: (!) 127/91 124/86 110/71 111/73   Pulse: 81 86 75 75   Resp: 18 18 16 16   Temp: 97.7 °F (36.5 °C) 98.3 °F (36.8 °C) 97.5 °F (36.4 °C) 98.1 °F (36.7 °C)   TempSrc: Oral Oral Oral Oral   SpO2: 98% 96% 99%          Physical Exam:  General:  no apparent distress, alert, and cooperative  Neurologic:  alert, oriented, normal speech, no focal findings or movement disorder noted  Lungs:  No increased work of breathing, good air exchange, no conversational dyspnea  Heart:  regular rate   Abdomen: abdomen soft, non-distended, non-tender  Extremities:  no calf tenderness, non edematous      Lab:  Lab Results   Component Value Date    HGB 11.8 (L) 2025     Lab Results   Component Value Date    HCT 36.3 2025       Assessment/Plan:  Maureen Acuna is a  PPD # 1 s/p    - Doing well, VSS   - male infant in General Care Nursery, circumcision desired and consent obtained   - Encourage ambulation   - Motrin/Tylenol for pain control    Rh positive/Rubella immune   - Rhogam/MMR not indicated    Breast feeding   -Counseled on s/sx of  Mastitis    gHTN (new dx)   - BP overall normotensive  - Denies s/s PreE   - PreE labs and P/C ordered for this AM  - No PO or IV anti-hypertensives   - Will need BP check in 3 days after discharge  - Continue to monitor closely    History of Heart Murmur  -Diagnosed as a child  -VSS, Asymptomatic   -Does not follow with Cardiology    BMI 28    Continue post partum care   - Considering discharge home today pending how breastfeeding goes    Counseling Completed:  Secondary Smoke risks and Sudden Infant Death Syndrome were reviewed with recommendations. Infant sleeping, \"back to sleep\" and avoidance of co-sleeping recommendations were reviewed.  Signs and Symptoms of Post Partum Depression were reviewed. The patient is to call if any occur.  Signs and symptoms of Mastitis were reviewed. The patient is to call if any occur for follow up.  Discharge instructions including pelvic rest, no driving with pain medicine and office follow-up were reviewed with patient     Attending Physician: Dr. Rosa Schwarz DO  Ob/Gyn Resident   3/1/2025, 6:15 AM          Attending Physician Statement  I have discussed the care of Maureen Acuna, including pertinent history and exam findings,  with the resident. I have seen and examined the patient and the key elements of all parts of the encounter have been performed by me.  I agree with the assessment, plan and orders as documented by the resident.  (GC Modifier)    Kimberlee Lee DO

## 2025-02-28 NOTE — PROGRESS NOTES
Obstetric/Gynecology Resident Interval Note    Fetal Heart Monitor:  Baseline Heart Rate 140, moderate variability, present accelerations, absent decelerations  Glenn: contractions, regular, every 3-4 minutes    Pitocin @ 4 terence-units/hour     Pt resting comfortably per RN. Continue to monitor closely.     Alexi Tapia MD  OB/GYN Resident, PGY3  [place], [place]   2/28/2025 5:38 AM

## 2025-02-28 NOTE — CARE COORDINATION
CASE MANAGEMENT POST-PARTUM TRANSITIONAL CARE PLAN    39 weeks gestation of pregnancy [Z3A.39]    OB Provider: Dr. Tran    Writer met w/ Maureen (her  Justin was asleep on the couch) at her bedside to discuss DCP. She is S/P  on 25 @ 40w1d of male infant    Writer verified address,her phone number and emergency contacts are all correct on facesheet.. She states she lives with her  and their 2 year old. She denied barriers with transportation home, to doctor's appointments or with paying for medications upon discharge home.     [employer] insurance correct. Writer notified her they have 30 days from date of birth to add  to insurance policy. She verbalized understanding.    Infant name on BC: Fred.   Infant PCP Honey Cheema    DME: None  HOME CARE: None    Anticipate DC home of couplet in private vehicle in 1-2 days status post vaginal delivery.    BS RISK OF UNPLANNED READMISSION 2.0             1.6 Total Score

## 2025-02-28 NOTE — PROGRESS NOTES
Labor Progress Note    Maureen Acuna is a 29 y.o. female  at 39w4d  The patient was seen and examined. Her pain is well controlled with an epidural. She reports fetal movement is present, complains of contractions, complains of loss of fluid, denies vaginal bleeding.       I have discussed the risk and benefits of AROM and the alternatives with the patient. Risks include cord prolapse, risk of infection, and emergency . All questions answered to their satisfaction. Patient amendable to AROM at this time.     Vital Signs:  Vitals:    25 2315 25 2331 25 0001 25 0030   BP: 138/87 (!) 116/55 125/86 105/81   Pulse: 82 80 87    Resp: 16 18     Temp:       TempSrc:       SpO2: 99%          FHT: 130, moderate variability, accelerations present, decelerations absent  Contractions: regular, every 4 minutes    Chaperone for Intimate Exam: Chaperone was present for entire exam, Chaperone Name: Cara STARK   Cervical Exam: 4 cm dilated, 50 effaced, -1 station  Pitocin: @ 0 mu/min    Membranes: Ruptured clear fluid  Scalp Electrode in place: absent  Intrauterine Pressure Catheter in Place: absent    Interventions: SVE and AROM    Assessment/Plan:  Maureen Acuna is a 29 y.o. female  at 39w4d admitted for RR IOL   - GBS negative, No indication for GBS prophylaxis   - VSS, afebrile    - Cat 1 FHT and TOCO showing regular contarctions    - Membranes Ruptured clear fluid   - SVE 4/50%/-1   - cEFM and TOCO              - Epidural in place and functioning               - S/p altamirano              - S/p Cytotec 25 VA x1, 50 BU x1              - Epidural ordered              - Continue to monitor      Attending updated and in agreement with plan    Shellie Bernardo MD  Ob/Gyn Resident  2025, 12:35 AM

## 2025-02-28 NOTE — PROGRESS NOTES
Labor Progress Note    Maureen Acuna is a 29 y.o. female  at 39w4d  The patient was seen and examined. Her pain is well controlled with an epidural. She reports fetal movement is present, complains of contractions, complains of loss of fluid, denies vaginal bleeding.       Vital Signs:  Vitals:    25 0430 25 0500 25 0530 25 0631   BP: 114/70 (!) 109/96 111/79 (!) 120/90   Pulse: 85 (!) 126 (!) 111 (!) 106   Resp: 16   18   Temp: 98.8 °F (37.1 °C)   99 °F (37.2 °C)   TempSrc: Oral   Oral   SpO2: 98% 97% 94%        FHT: 130, moderate variability, accelerations present, intermittent variable decelerations with a gale to 110  Contractions: regular, every 2 minutes    Chaperone for Intimate Exam: Chaperone was present for entire exam, Chaperone Name: Marianne STARK  Cervical Exam: 6 cm dilated, 90 effaced, 0 station  Pitocin: @ 4 mu/min    Membranes: Ruptured clear fluid  Scalp Electrode in place: absent  Intrauterine Pressure Catheter in Place: absent    Interventions: SVE    Assessment/Plan:  Maureen Acuna is a 29 y.o. female  at 39w4d admitted for RR IOL    - GBS negative, No indication for GBS prophylaxis   - VSS, afebrile    - Cat 1 FHT and TOCO showing regular contractions   - Membranes ruptured clear fluid    - SVE 6/90%/0   - cEFM and TOCO   - Pitocin per protocol               - Epidural in place and functioning               - S/p altamirano              - S/p Cytotec 25 VA x1, 50 BU x1              - Epidural ordered              - Continue to monitor     Attending updated and in agreement with plan    Shellie Bernardo MD  Ob/Gyn Resident  2025, 6:39 AM

## 2025-02-28 NOTE — LACTATION NOTE
Called to room to assist pt with latch. Baby awake and rooting in skin to skin. Attempted laid back position and baby  latched well to left breast, strong sustained suck and comfortable per mother. Reviewed basics with  mother as she states she feels like she is forgetting everything she did with her first child and breastfeeding. Encouraged her to call out as needed.   [FreeTextEntry1] : Pt is here for follow up of multiple medical problems including\par depression, \par

## 2025-02-28 NOTE — FLOWSHEET NOTE
2240,   SAE Lu, at bedside.  Epidural procedure explained, risks discussed.  Pt verbalizes consent for epidural.   2240 patient positioned for epidural.2241 Time out completed.   2249 catheter placed. 2250 test dose given.  Epidural catheter taped and secured per anesthesia.   2255 to low fowlers with left uterine displacement. 2259 loading dose given. 2301 pump initiated.  Pt tolerated procedure well.

## 2025-02-28 NOTE — FLOWSHEET NOTE
.Pt educated on the use of self-administered  nitrous oxide for pain control and side effects. Pt educated on when and how to use the mask appropriately. Pt educated that she is the only person allowed to hold the mask to her face and that no one should prop the mask against her face. Pt also educated that she is the only person in the room allowed to use the mask.Pt informed that she cannot be out of bed without a trained support person with her. Pt completed a return demonstration of the use of nitrous oxide and all questions and concerns were addressed. RN verified the presence of a signed agreement form for the nitrous oxide. Pre-intervention VS, assessment, and FHT'st as charted. Nitrous oxide and oxygen at a 50-50 mix was initiated at . RN remains at bedside for the first 15 mins post initiation. Pt has experienced no  as side effects at this time.      Started at 2122

## 2025-02-28 NOTE — FLOWSHEET NOTE
Patient admitted to room 737 from L&D via wheelchair.   Oriented to room and surroundings.  Plan of care reviewed.  Verbalized understanding.  Instructed on infant security and safe sleep practices.  Preventing falls education provided .The following handouts given: A New Beginning: Your Guide to Postpartum Care, Rounding, gs Security System,Babies Cry A lot, Safe Sleep, Security and Visitation Guidelines.   Call light placed within reach.

## 2025-02-28 NOTE — FLOWSHEET NOTE
RN at bedside per pt request. Pt states nitrous is not helping with ctx pain and would like the epidural. Pt agreeable to trying removal of altamirano. Rn put gentle traction on balloon and balloon easily fell out. Pt tolerated well. Pt request epidural prior to SVE. Dr Bernardo updated face to face in nurses station. See orders

## 2025-02-28 NOTE — PROGRESS NOTES
Labor Progress Note    Maureen Acuna is a 29 y.o. female  at 39w4d  The patient was seen and examined. Her pain is well controlled with an epidural. She reports fetal movement is present, complains of contractions, complains of loss of fluid, denies vaginal bleeding.       Vital Signs:  Vitals:    25 0130 25 0200 25 0230 25 0300   BP: 124/68 129/79 118/70 111/67   Pulse: 88 (!) 102 94 94   Resp:  16  16   Temp:  99 °F (37.2 °C)     TempSrc:  Oral     SpO2:           FHT:  135 , moderate variability, accelerations present, decelerations absent  Contractions: irregular    Chaperone for Intimate Exam: Chaperone was present for entire exam, Chaperone Name: Marianne STARK  Cervical Exam: 4 cm dilated, 70 effaced, -1 station  Pitocin: @ 0 mu/min    Membranes: Ruptured clear fluid  Scalp Electrode in place: absent  Intrauterine Pressure Catheter in Place: absent    Interventions: SVE     Assessment/Plan:  Maureen Acuna is a 29 y.o. female  at 39w4d admitted for RR IOL    - GBS negative, No indication for GBS prophylaxis   - VSS, afebrile    - Cat 1 FHT and TOCO showing irregular contractions    - Membranes ruptured clear fluid    - SVE 4/70%/-1   - cEFM and TOCO   - Pitocin ordered              - Epidural in place and functioning               - S/p altamirano              - S/p Cytotec 25 VA x1, 50 BU x1              - Epidural ordered              - Continue to monitor      Attending updated and in agreement with plan    Shellie Bernardo MD  Ob/Gyn Resident  2025, 3:09 AM

## 2025-02-28 NOTE — CONSULTS
Breastfeeding education given to pt. Pt reports baby fed for a short time at the breast in recovery. Reviewed feeding patterns for first 24 hours. Encouraged skin to skin as much as possible and offering the breast at least every 3 hours. Reviewed hunger cues and encouraged a feed attempt soon. Encouraged pt to call out as needed.

## 2025-02-28 NOTE — PROGRESS NOTES
Labor Progress Note    Maureen Acuna is a 29 y.o. female  at 39w3d  The patient was seen and examined. Her pain is well controlled. She reports fetal movement is present, complains of contractions, denies loss of fluid, denies vaginal bleeding.       Vital Signs:  Vitals:    25 1051 25 1603 25 1716   BP: 129/75  126/76   Pulse: 91 95 98   Resp: 16 16    Temp: 98.6 °F (37 °C) 98.6 °F (37 °C)    TempSrc: Oral Oral        FHT: 130, moderate variability, accelerations present, decelerations absent  Contractions: regular, every 3 minutes    Chaperone for Intimate Exam: Chaperone was present for entire exam, Chaperone Name: Billy STARK   Cervical Exam: 2 cm dilated, 50 effaced, -2 station  Pitocin: @ 0 mu/min    Membranes: Intact  Scalp Electrode in place: absent  Intrauterine Pressure Catheter in Place: absent    Interventions: SVE and altamirano balloon placement     Assessment/Plan:  Maureen Acuna is a 29 y.o. female  at 39w3d admitted for RR IOL    - GBS negative, No indication for GBS prophylaxis   - VSS, afebrile    - Cat 1 FHT and TOCO showing regular contractions    - Membranes Intact    - SVE 2/50%/-2   - cEFM and TOCO   - 2mg IV morphine followed by 8mg IM morphine and 10mg IM compazine for pain control during altamirano balloon placement    - Altamirano balloon placed and patient tolerated it well   - S/p Cytotec 25 mcg VA, 50 BU x 1   - Continue to monitor     Attending updated and in agreement with plan    Shellie Bernardo MD  Ob/Gyn Resident  2025, 7:16 PM

## 2025-02-28 NOTE — ANESTHESIA PRE PROCEDURE
Department of Anesthesiology  Preprocedure Note       Name:  Maureen Acuna   Age:  29 y.o.  :  1995                                          MRN:  [id number]         Date:  2025      Surgeon: * No surgeons listed *    Procedure: * No procedures listed *    Medications prior to admission:   Prior to Admission medications    Medication Sig Start Date End Date Taking? Authorizing Provider   Prenatal MV-Min-Fe Fum-FA-DHA (PRENATAL 1 PO) Take 1 tablet by mouth daily    Provider, MD Greg       Current medications:    Current Facility-Administered Medications   Medication Dose Route Frequency Provider Last Rate Last Admin    lactated ringers infusion   IntraVENous Continuous Katey Shepherd  mL/hr at 25 Rate Verify at 25    sodium chloride flush 0.9 % injection 5-40 mL  5-40 mL IntraVENous 2 times per day Katey Shepherd DO        sodium chloride flush 0.9 % injection 5-40 mL  5-40 mL IntraVENous PRN Katey Shepherd,         0.9 % sodium chloride infusion   IntraVENous PRN Katey Shepherd,         lidocaine PF 1 % injection 30 mL  30 mL Other PRN Katey Shepherd,         diphenhydrAMINE (BENADRYL) tablet 25 mg  25 mg Oral Q4H PRN Katey Shepherd, DO        Or    diphenhydrAMINE (BENADRYL) injection 25 mg  25 mg IntraVENous Q4H PRN Katey Shepherd,         acetaminophen (TYLENOL) tablet 1,000 mg  1,000 mg Oral Q6H PRN Katey Shepherd,         witch hazel-glycerin (TUCKS) pad   Topical PRN Katey Shepherd,         magnesium hydroxide (MILK OF MAGNESIA) 400 MG/5ML suspension 30 mL  30 mL Oral Daily PRN Katey Shepherd,         sennosides-docusate sodium (SENOKOT-S) 8.6-50 MG tablet 1 tablet  1 tablet Oral Daily Katey Shepherd DO        miSOPROStol (CYTOTEC) pre-split tablet TABS 50 mcg  50 mcg Buccal Once Shellie Bernardo MD        nitrous oxide 50% inhalation 1 each  1 each Inhalation Continuous PRN Shellie Bernardo MD   1 each at 25     ROS                    Abdominal: normal exam            Vascular: negative vascular ROS.         Other Findings: Past Medical History:  No date: Migraines  Past Surgical History:  7/19/2024: NECK SURGERY; N/A      Comment:  EXCISION EPIDERMOID CYST LEFT POSTERIOR NECK performed                by Jamel Sutton MD at [facility] OR  No date: WISDOM TOOTH EXTRACTION              Anesthesia Plan      epidural     ASA 2             Anesthetic plan and risks discussed with patient.      Plan discussed with attending.                    FEROZ CHOWDARY, APRN - CRNA   2/27/2025

## 2025-02-28 NOTE — PROGRESS NOTES
Labor Progress Note    Maureen Acuna is a 29 y.o. female  at 39w3d  The patient was seen and examined. Her pain is not well controlled. She reports fetal movement is present, complains of contractions, denies loss of fluid, denies vaginal bleeding.       Vital Signs:  Vitals:    25 1051 25 1603 25 1716 25   BP: 129/75  126/76 130/77   Pulse: 91 95 98 80   Resp: 16 16     Temp: 98.6 °F (37 °C) 98.6 °F (37 °C)  98.6 °F (37 °C)   TempSrc: Oral Oral  Oral       FHT: 120, moderate variability, accelerations present, decelerations absent  Contractions: regular, every 4-5 minutes    Chaperone for Intimate Exam: Chaperone was present for entire exam, Chaperone Name: Cara STARK  Cervical Exam: 4 cm dilated, 50 effaced, -1 station  Pitocin: @ 0 mu/min    Membranes: Intact  Scalp Electrode in place: absent  Intrauterine Pressure Catheter in Place: absent    Interventions: SVE     Assessment/Plan:  Maureen Acuna is a 29 y.o. female  at 39w3d admitted for RR IOL    - GBS negative, No indication for GBS prophylaxis   - VSS, afebrile    - Cat 1 FHT and TOCO showing regular contractions   - Membranes Intact   - SVE 4/50%/-1   - cEFM and TOCO   - Epidural ordered   - S/p altamirano   - S/p Cytotec 25 VA x1, 50 BU x1   - Epidural ordered   - Continue to monitor     Attending updated and in agreement with plan    Shellie Bernardo MD  Ob/Gyn Resident  2025, 10:33 PM

## 2025-03-01 VITALS
OXYGEN SATURATION: 98 % | TEMPERATURE: 99.3 F | RESPIRATION RATE: 18 BRPM | SYSTOLIC BLOOD PRESSURE: 111 MMHG | DIASTOLIC BLOOD PRESSURE: 78 MMHG | HEART RATE: 86 BPM

## 2025-03-01 LAB
ALBUMIN SERPL-MCNC: 2.9 G/DL (ref 3.5–5.2)
ALBUMIN/GLOB SERPL: 1.1 {RATIO} (ref 1–2.5)
ALP SERPL-CCNC: 104 U/L (ref 35–104)
ALT SERPL-CCNC: 13 U/L (ref 10–35)
ANION GAP SERPL CALCULATED.3IONS-SCNC: 10 MMOL/L (ref 9–16)
AST SERPL-CCNC: 35 U/L (ref 10–35)
BASOPHILS # BLD: 0.06 K/UL (ref 0–0.2)
BASOPHILS NFR BLD: 1 % (ref 0–2)
BILIRUB SERPL-MCNC: 0.2 MG/DL (ref 0–1.2)
BUN SERPL-MCNC: 8 MG/DL (ref 6–20)
CALCIUM SERPL-MCNC: 8.7 MG/DL (ref 8.6–10.4)
CHLORIDE SERPL-SCNC: 108 MMOL/L (ref 98–107)
CO2 SERPL-SCNC: 21 MMOL/L (ref 20–31)
CREAT SERPL-MCNC: 0.6 MG/DL (ref 0.6–0.9)
CREAT UR-MCNC: 47.6 MG/DL (ref 28–217)
EOSINOPHIL # BLD: 0.22 K/UL (ref 0–0.44)
EOSINOPHILS RELATIVE PERCENT: 2 % (ref 1–4)
ERYTHROCYTE [DISTWIDTH] IN BLOOD BY AUTOMATED COUNT: 14.2 % (ref 11.8–14.4)
GFR, ESTIMATED: >90 ML/MIN/1.73M2
GLUCOSE SERPL-MCNC: 89 MG/DL (ref 74–99)
HCT VFR BLD AUTO: 36.3 % (ref 36.3–47.1)
HGB BLD-MCNC: 11.8 G/DL (ref 11.9–15.1)
IMM GRANULOCYTES # BLD AUTO: 0.14 K/UL (ref 0–0.3)
IMM GRANULOCYTES NFR BLD: 1 %
LYMPHOCYTES NFR BLD: 2.67 K/UL (ref 1.1–3.7)
LYMPHOCYTES RELATIVE PERCENT: 21 % (ref 24–43)
MCH RBC QN AUTO: 30.1 PG (ref 25.2–33.5)
MCHC RBC AUTO-ENTMCNC: 32.5 G/DL (ref 28.4–34.8)
MCV RBC AUTO: 92.6 FL (ref 82.6–102.9)
MONOCYTES NFR BLD: 0.87 K/UL (ref 0.1–1.2)
MONOCYTES NFR BLD: 7 % (ref 3–12)
NEUTROPHILS NFR BLD: 69 % (ref 36–65)
NEUTS SEG NFR BLD: 8.78 K/UL (ref 1.5–8.1)
NRBC BLD-RTO: 0 PER 100 WBC
PLATELET # BLD AUTO: 284 K/UL (ref 138–453)
PMV BLD AUTO: 9.6 FL (ref 8.1–13.5)
POTASSIUM SERPL-SCNC: 4 MMOL/L (ref 3.7–5.3)
PROT SERPL-MCNC: 5.5 G/DL (ref 6.6–8.7)
RBC # BLD AUTO: 3.92 M/UL (ref 3.95–5.11)
SODIUM SERPL-SCNC: 139 MMOL/L (ref 136–145)
TOTAL PROTEIN, URINE: 12 MG/DL
URINE TOTAL PROTEIN CREATININE RATIO: 0.25 (ref 0–0.2)
WBC OTHER # BLD: 12.7 K/UL (ref 3.5–11.3)

## 2025-03-01 PROCEDURE — 6370000000 HC RX 637 (ALT 250 FOR IP)

## 2025-03-01 PROCEDURE — 85025 COMPLETE CBC W/AUTO DIFF WBC: CPT

## 2025-03-01 PROCEDURE — 84156 ASSAY OF PROTEIN URINE: CPT

## 2025-03-01 PROCEDURE — 2500000003 HC RX 250 WO HCPCS

## 2025-03-01 PROCEDURE — 80053 COMPREHEN METABOLIC PANEL: CPT

## 2025-03-01 PROCEDURE — 36415 COLL VENOUS BLD VENIPUNCTURE: CPT

## 2025-03-01 PROCEDURE — 99024 POSTOP FOLLOW-UP VISIT: CPT | Performed by: STUDENT IN AN ORGANIZED HEALTH CARE EDUCATION/TRAINING PROGRAM

## 2025-03-01 PROCEDURE — 82570 ASSAY OF URINE CREATININE: CPT

## 2025-03-01 RX ADMIN — SODIUM CHLORIDE, PRESERVATIVE FREE 10 ML: 5 INJECTION INTRAVENOUS at 09:24

## 2025-03-01 RX ADMIN — ACETAMINOPHEN 1000 MG: 500 TABLET ORAL at 01:39

## 2025-03-01 RX ADMIN — IBUPROFEN 600 MG: 600 TABLET, FILM COATED ORAL at 13:04

## 2025-03-01 RX ADMIN — DOCUSATE SODIUM 100 MG: 100 CAPSULE, LIQUID FILLED ORAL at 09:15

## 2025-03-01 RX ADMIN — ACETAMINOPHEN 1000 MG: 500 TABLET ORAL at 09:18

## 2025-03-01 RX ADMIN — IBUPROFEN 600 MG: 600 TABLET, FILM COATED ORAL at 05:24

## 2025-03-01 ASSESSMENT — PAIN DESCRIPTION - LOCATION
LOCATION: ABDOMEN
LOCATION: ABDOMEN

## 2025-03-01 ASSESSMENT — PAIN - FUNCTIONAL ASSESSMENT
PAIN_FUNCTIONAL_ASSESSMENT: ACTIVITIES ARE NOT PREVENTED
PAIN_FUNCTIONAL_ASSESSMENT: ACTIVITIES ARE NOT PREVENTED

## 2025-03-01 ASSESSMENT — PAIN DESCRIPTION - ORIENTATION
ORIENTATION: LOWER
ORIENTATION: LOWER

## 2025-03-01 ASSESSMENT — PAIN DESCRIPTION - DESCRIPTORS
DESCRIPTORS: CRAMPING
DESCRIPTORS: CRAMPING

## 2025-03-01 ASSESSMENT — PAIN SCALES - GENERAL
PAINLEVEL_OUTOF10: 2
PAINLEVEL_OUTOF10: 2
PAINLEVEL_OUTOF10: 3

## 2025-03-01 NOTE — FLOWSHEET NOTE
Patient discharged home with FOB and baby, baby secured in car seat per parents , all belongings sent .

## 2025-03-01 NOTE — LACTATION NOTE
Pt states feeds going much better, discharge instructions and LC follow up reviewed.  Baby to be circumcised today before discharge, discussed post circ behaviors and benefits of skin to skin after the procedure.

## 2025-03-01 NOTE — PROGRESS NOTES
CLINICAL PHARMACY NOTE: MEDS TO BEDS    Total # of Prescriptions Filled: 3   The following medications were delivered to the patient:  Senexon 8.6-50 mg  Ibuprofen 600 mg  Acetaminophen 500 mg    Additional Documentation:   Delivered to pt on 3/1 at 11:12 am. Pt had copay of 8.07 and paid via [employer].

## 2025-03-01 NOTE — FLOWSHEET NOTE
I have reviewed all A[facility] Post-Birth Warning Signs and essential teaching points for pulmonary embolism, cardiac disease, hypertensive disorders of pregnancy, obstetric hemorrhage, venous thromboembolism, infection, and postpartum depression with the patient and FOB(support person) . I have informed the patient on when to call their healthcare provider and when to call 911. I have discussed with the patient  the importance of scheduling a follow-up visit with their physician, nurse practitioner or midwife and provided them with correct contact information for appointment. I have provided the patient with a copy of the \"Save Your Life\" handout. The patient has acknowledged receiving and understanding this education with her signature.

## 2025-03-02 NOTE — CARE COORDINATION
Discharge Report    [facility]  Clinical Case Management Department  Written by: Janna Veronica RN    Patient Name: Maureen JUNIOR Armand  Attending Provider: No att. providers found  Admit Date: 2025 10:26 AM  MRN: [id number]  Account: [id number]                     : 1995  Discharge Date: 3/1/2025      Disposition: home    Janna Veronica RN

## 2025-03-04 NOTE — ANESTHESIA POSTPROCEDURE EVALUATION
Department of Anesthesiology  Postprocedure Note    Patient: Maureen Acuna  MRN: [id number]  YOB: 1995  Date of evaluation: 3/4/2025    Procedure Summary       Date: 02/27/25 Room / Location:     Anesthesia Start: 2240 Anesthesia Stop: 02/28/25 0844    Procedure: Labor Analgesia Diagnosis:     Scheduled Providers:  Responsible Provider: Iliana Cummings MD    Anesthesia Type: epidural ASA Status: 2            Anesthesia Type: No value filed.    Salbador Phase I:      Salbador Phase II:      Anesthesia Post Evaluation    Patient location during evaluation: PACU  Patient participation: complete - patient participated  Level of consciousness: awake  Airway patency: patent  Nausea & Vomiting: no nausea and no vomiting  Cardiovascular status: blood pressure returned to baseline  Respiratory status: acceptable  Hydration status: euvolemic  Pain management: adequate    No notable events documented.

## 2025-03-05 ENCOUNTER — TELEPHONE (OUTPATIENT)
Dept: OBGYN CLINIC | Age: 30
End: 2025-03-05

## (undated) DEVICE — GLOVE ORTHO 7 1/2   MSG9475

## (undated) DEVICE — DBD-BIOPSY PUNCH,STERILE,DISP,8MM,50/BX: Brand: MEDLINE

## (undated) DEVICE — PREMIUM DRY TRAY LF: Brand: MEDLINE INDUSTRIES, INC.

## (undated) DEVICE — ST. CHARLES BASIC SET UP: Brand: MEDLINE INDUSTRIES, INC.

## (undated) DEVICE — SOLUTION IRRIG 1000ML STRL H2O USP PLAS POUR BTL

## (undated) DEVICE — SOLUTION SCRB 4OZ 4% CHG H2O AIDED FOR PREOPERATIVE SKIN

## (undated) DEVICE — DRESSING TRNSPAR W2XL2.75IN FLM SHT SEMIPERMEABLE WIND

## (undated) DEVICE — DRAPE,THYROID,SOFT,STERILE: Brand: MEDLINE

## (undated) DEVICE — PAD,NON-ADHERENT,3X8,STERILE,LF,1/PK: Brand: MEDLINE

## (undated) DEVICE — LIQUIBAND RAPID ADHESIVE 36/CS 0.8ML: Brand: MEDLINE

## (undated) DEVICE — SOLUTION IRRIG 1000ML 0.9% SOD CHL USP POUR PLAS BTL